# Patient Record
Sex: FEMALE | Race: WHITE | URBAN - METROPOLITAN AREA
[De-identification: names, ages, dates, MRNs, and addresses within clinical notes are randomized per-mention and may not be internally consistent; named-entity substitution may affect disease eponyms.]

---

## 2022-07-06 ENCOUNTER — TELEPHONE (OUTPATIENT)
Dept: PAIN MANAGEMENT | Age: 32
End: 2022-07-06

## 2022-07-12 NOTE — TELEPHONE ENCOUNTER
Mailbox was full and could not accept calls at this time.  Was trying to call pt to let her know that we have appts avail this Thursday 7/14 and Friday 7/15

## 2022-07-15 ENCOUNTER — OFFICE VISIT (OUTPATIENT)
Dept: PAIN MANAGEMENT | Age: 32
End: 2022-07-15
Payer: COMMERCIAL

## 2022-07-15 VITALS
HEART RATE: 68 BPM | OXYGEN SATURATION: 99 % | HEIGHT: 67 IN | BODY MASS INDEX: 25.58 KG/M2 | DIASTOLIC BLOOD PRESSURE: 86 MMHG | TEMPERATURE: 97 F | WEIGHT: 163 LBS | SYSTOLIC BLOOD PRESSURE: 132 MMHG

## 2022-07-15 DIAGNOSIS — F11.20 UNCOMPLICATED OPIOID DEPENDENCE (HCC): Primary | ICD-10-CM

## 2022-07-15 PROCEDURE — 99213 OFFICE O/P EST LOW 20 MIN: CPT | Performed by: PAIN MEDICINE

## 2022-07-15 PROCEDURE — 80305 DRUG TEST PRSMV DIR OPT OBS: CPT | Performed by: PAIN MEDICINE

## 2022-07-15 RX ORDER — BUPRENORPHINE HYDROCHLORIDE, NALOXONE HYDROCHLORIDE 2; .5 MG/1; MG/1
1 FILM, SOLUBLE BUCCAL; SUBLINGUAL DAILY
Qty: 28 FILM | Refills: 0 | Status: SHIPPED | OUTPATIENT
Start: 2022-07-15 | End: 2022-08-15 | Stop reason: SDUPTHER

## 2022-07-15 RX ORDER — BUPRENORPHINE HYDROCHLORIDE, NALOXONE HYDROCHLORIDE 2; .5 MG/1; MG/1
FILM, SOLUBLE BUCCAL; SUBLINGUAL
COMMUNITY
Start: 2022-06-21 | End: 2022-07-15 | Stop reason: SDUPTHER

## 2022-07-15 ASSESSMENT — ENCOUNTER SYMPTOMS
GASTROINTESTINAL NEGATIVE: 1
RESPIRATORY NEGATIVE: 1
ALLERGIC/IMMUNOLOGIC NEGATIVE: 1
EYES NEGATIVE: 1

## 2022-07-15 NOTE — PROGRESS NOTES
Georgia Ramos (:  1990) is a [address] y.o. female,Established patient, here for evaluation of the following chief complaint(s):  No chief complaint on file. ASSESSMENT/PLAN:  1. Uncomplicated opioid dependence (HCC)  -     SUBOXONE 2-0.5 MG FILM SL film; Place 1 Film under the tongue in the morning for 28 days. , Disp-28 Film, R-0, DAWNormal    Pt is here today for follow up in our addiction clinic. She is due refill of Suboxone 2 mg-0.5 mg sublingual film place 1.5 films every day by sublingual route as needed for 28 days. No cravings and no withdrawals. She brought in meeting sheet with 11 signatures on it. No allergic reactions, no reactions and able to ADLs. Westville Rasp is reviewed and consistent. UA is consistent. Will continue current meds. Subjective   SUBJECTIVE/OBJECTIVE:  History of Present Illness    Patient Identification  Georgia Ramos is a [address] y.o. female. Patient information was obtained from patient. History/Exam limitations: none. Patient presented to the Emergency Department ambulatory. Chief Complaint   No chief complaint on file. Pt is here today for follow up in our addiction clinic. She is due refill of Suboxone 2 mg-0.5 mg sublingual film place 1.5 films every day by sublingual route as needed for 28 days. No cravings and no withdrawals. She brought in meeting sheet with 11 signatures on it. No allergic reactions, no reactions and able to ADLs. Westville Rasp is reviewed and consistent. UA is consistent. Past Medical History:  No date: Hepatitis C  History reviewed. No pertinent family history. Current Outpatient Medications:  SUBOXONE 2-0.5 MG FILM SL film, Place 1 Film under the tongue in the morning for 28 days. , Disp: 28 Film, Rfl: 0  gabapentin (NEURONTIN) 300 MG capsule, Take 300 mg by mouth 3 times daily (Patient not taking: Reported on 7/15/2022), Disp: , Rfl:   citalopram (CELEXA) 40 MG tablet, Take 40 mg by mouth daily (Patient not taking: Reported on 7/15/2022), Disp: , Rfl:     No current facility-administered medications for this visit. No Known Allergies  Social History    Socioeconomic History      Marital status: Single      Spouse name: Not on file      Number of children: Not on file      Years of education: Not on file      Highest education level: Not on file    Occupational History      Not on file    Tobacco Use      Smoking status: Every Day        Packs/day: 1.00        Years: 16.00        Pack years: 16        Types: Cigarettes      Smokeless tobacco: Never    Substance and Sexual Activity      Alcohol use: Never      Drug use: on Rx. Sexual activity: Not on file  /86   Pulse 68   Temp 97 °F (36.1 °C)   Ht 5' 7\" (1.702 m)   Wt 163 lb (73.9 kg)   SpO2 99%   BMI 25.53 kg/m²       Review of Systems   Constitutional: Negative. HENT: Negative. Eyes: Negative. Respiratory: Negative. Cardiovascular: Negative. Gastrointestinal: Negative. Endocrine: Negative. Genitourinary: Negative. Musculoskeletal: Negative. Skin: Negative. Allergic/Immunologic: Negative. Neurological: Negative. Hematological: Negative. Psychiatric/Behavioral: Negative. All other systems reviewed and are negative. Objective   Physical Exam  Vitals and nursing note reviewed. Constitutional:       Appearance: Normal appearance. She is normal weight. HENT:      Head: Normocephalic and atraumatic. Nose: Nose normal.      Mouth/Throat:      Mouth: Mucous membranes are moist.   Eyes:      Extraocular Movements: Extraocular movements intact. Conjunctiva/sclera: Conjunctivae normal.      Pupils: Pupils are equal, round, and reactive to light. Cardiovascular:      Rate and Rhythm: Normal rate and regular rhythm. Pulses: Normal pulses. Heart sounds: Normal heart sounds. Pulmonary:      Effort: Pulmonary effort is normal.      Breath sounds: Normal breath sounds.    Abdominal:      General: Abdomen is

## 2022-08-15 ENCOUNTER — OFFICE VISIT (OUTPATIENT)
Dept: PAIN MANAGEMENT | Age: 32
End: 2022-08-15
Payer: COMMERCIAL

## 2022-08-15 VITALS
HEART RATE: 76 BPM | TEMPERATURE: 97.6 F | SYSTOLIC BLOOD PRESSURE: 136 MMHG | OXYGEN SATURATION: 99 % | HEIGHT: 67 IN | WEIGHT: 163 LBS | BODY MASS INDEX: 25.58 KG/M2 | DIASTOLIC BLOOD PRESSURE: 82 MMHG

## 2022-08-15 DIAGNOSIS — F11.20 UNCOMPLICATED OPIOID DEPENDENCE (HCC): Primary | ICD-10-CM

## 2022-08-15 PROCEDURE — 80305 DRUG TEST PRSMV DIR OPT OBS: CPT | Performed by: PAIN MEDICINE

## 2022-08-15 PROCEDURE — 99213 OFFICE O/P EST LOW 20 MIN: CPT | Performed by: PAIN MEDICINE

## 2022-08-15 RX ORDER — BUPRENORPHINE HYDROCHLORIDE, NALOXONE HYDROCHLORIDE 2; .5 MG/1; MG/1
1 FILM, SOLUBLE BUCCAL; SUBLINGUAL DAILY
Qty: 28 FILM | Refills: 0 | Status: SHIPPED | OUTPATIENT
Start: 2022-08-15 | End: 2022-09-12 | Stop reason: SDUPTHER

## 2022-08-15 NOTE — PROGRESS NOTES
Jaye Garcia (:  1990) is a 27 y.o. female,Established patient, here for evaluation of the following chief complaint(s):  No chief complaint on file. ASSESSMENT/PLAN:  1. Uncomplicated opioid dependence (HCC)  -     SUBOXONE 2-0.5 MG FILM SL film; Place 1 Film under the tongue in the morning for 28 days. , Disp-28 Film, R-0, DAWNormal    Pt is here today for follow up in our addiction clinic. She is due refill of Suboxone 2 mg-0.5 mg sublingual film place 1.5 films every day by sublingual route as needed for 28 days. No cravings and no withdrawals. She brought in meeting sheet with 11 signatures on it. No allergic reactions, no reactions and able to ADLs. Bev Paganini is reviewed and consistent. UA is consistent. Will continue current meds. Subjective   SUBJECTIVE/OBJECTIVE:  History of Present Illness    Patient Identification  Jaye Garcia is a 27 y.o. female. Patient information was obtained from patient. History/Exam limitations: none. Patient presented to the Emergency Department ambulatory. Chief Complaint   No chief complaint on file. Pt is here today for follow up in our addiction clinic. She is due refill of Suboxone 2 mg-0.5 mg sublingual film place 1.5 films every day by sublingual route as needed for 28 days. No cravings and no withdrawals. She brought in meeting sheet with 11 signatures on it. No allergic reactions, no reactions and able to ADLs. Bev Paganini is reviewed and consistent. UA in house  is consistent. Past Medical History:  No date: Hepatitis C  History reviewed. No pertinent family history. Current Outpatient Medications:  SUBOXONE 2-0.5 MG FILM SL film, Place 1 Film under the tongue in the morning for 28 days. , Disp: 28 Film, Rfl: 0  gabapentin (NEURONTIN) 300 MG capsule, Take 300 mg by mouth 3 times daily (Patient not taking: Reported on 7/15/2022), Disp: , Rfl:   citalopram (CELEXA) 40 MG tablet, Take 40 mg by mouth daily (Patient not taking: Reported on 7/15/2022), Disp: , Rfl:     No current facility-administered medications for this visit. No Known Allergies  Social History    Socioeconomic History      Marital status: Single      Spouse name: Not on file      Number of children: Not on file      Years of education: Not on file      Highest education level: Not on file    Occupational History      Not on file    Tobacco Use      Smoking status: Every Day        Packs/day: 1.00        Years: 16.00        Pack years: 16        Types: Cigarettes      Smokeless tobacco: Never    Substance and Sexual Activity      Alcohol use: Never      Drug use: on Rx. Sexual activity: Not on file  /86   Pulse 68   Temp 97 °F (36.1 °C)   Ht 5' 7\" (1.702 m)   Wt 163 lb (73.9 kg)   SpO2 99%   BMI 25.53 kg/m²       Review of Systems   Constitutional: Negative. HENT: Negative. Eyes: Negative. Respiratory: Negative. Cardiovascular: Negative. Gastrointestinal: Negative. Endocrine: Negative. Genitourinary: Negative. Musculoskeletal: Negative. Skin: Negative. Allergic/Immunologic: Negative. Neurological: Negative. Hematological: Negative. Psychiatric/Behavioral: Negative. All other systems reviewed and are negative. Objective   Physical Exam  Vitals and nursing note reviewed. Constitutional:       Appearance: Normal appearance. She is normal weight. HENT:      Head: Normocephalic and atraumatic. Nose: Nose normal.      Mouth/Throat:      Mouth: Mucous membranes are moist.   Eyes:      Extraocular Movements: Extraocular movements intact. Conjunctiva/sclera: Conjunctivae normal.      Pupils: Pupils are equal, round, and reactive to light. Cardiovascular:      Rate and Rhythm: Normal rate and regular rhythm. Pulses: Normal pulses. Heart sounds: Normal heart sounds. Pulmonary:      Effort: Pulmonary effort is normal.      Breath sounds: Normal breath sounds.    Abdominal:      General: Abdomen

## 2022-09-12 ENCOUNTER — OFFICE VISIT (OUTPATIENT)
Dept: PAIN MANAGEMENT | Age: 32
End: 2022-09-12
Payer: COMMERCIAL

## 2022-09-12 VITALS
WEIGHT: 160 LBS | TEMPERATURE: 97.1 F | DIASTOLIC BLOOD PRESSURE: 80 MMHG | SYSTOLIC BLOOD PRESSURE: 118 MMHG | HEIGHT: 67 IN | BODY MASS INDEX: 25.11 KG/M2

## 2022-09-12 DIAGNOSIS — F11.20 UNCOMPLICATED OPIOID DEPENDENCE (HCC): Primary | ICD-10-CM

## 2022-09-12 PROCEDURE — 99213 OFFICE O/P EST LOW 20 MIN: CPT | Performed by: PAIN MEDICINE

## 2022-09-12 PROCEDURE — 80305 DRUG TEST PRSMV DIR OPT OBS: CPT | Performed by: PAIN MEDICINE

## 2022-09-12 RX ORDER — BUPRENORPHINE HYDROCHLORIDE, NALOXONE HYDROCHLORIDE 2; .5 MG/1; MG/1
1 FILM, SOLUBLE BUCCAL; SUBLINGUAL DAILY
Qty: 28 FILM | Refills: 0 | Status: SHIPPED | OUTPATIENT
Start: 2022-09-12 | End: 2022-10-10 | Stop reason: SDUPTHER

## 2022-09-12 NOTE — PROGRESS NOTES
Kaylyn Ramirez (:  1990) is a [address] y.o. female,Established patient, here for evaluation of the following chief complaint(s):  No chief complaint on file. ASSESSMENT/PLAN:  1. Uncomplicated opioid dependence (HCC)  -     SUBOXONE 2-0.5 MG FILM SL film; Place 1 Film under the tongue in the morning for 28 days. , Disp-28 Film, R-0, DAWNormal    Pt is here today for follow up in our addiction clinic. She is due refill of Suboxone 2 mg-0.5 mg sublingual film place 1.0  films every day by sublingual route as needed for 28 days. No cravings and no withdrawals. She brought in meeting sheet with 11 signatures on it. No allergic reactions, no reactions and able to ADLs. Angie Jump is reviewed and consistent. UA is consistent. Will continue current meds. Subjective   SUBJECTIVE/OBJECTIVE:  History of Present Illness    Patient Identification  Kaylyn Ramirez is a [address] y.o. female. Patient information was obtained from patient. History/Exam limitations: none. Patient presented to the Emergency Department ambulatory. Chief Complaint   No chief complaint on file. Pt is here today for follow up in our addiction clinic. She is due refill of Suboxone 2 mg-0.5 mg sublingual film place 1.5 films every day by sublingual route as needed for 28 days. No cravings and no withdrawals. She brought in meeting sheet with 11 signatures on it. No allergic reactions, no reactions and able to ADLs. Angie Jump is reviewed and consistent. UA in house  is consistent. Past Medical History:  No date: Hepatitis C  History reviewed. No pertinent family history. Current Outpatient Medications:  SUBOXONE 2-0.5 MG FILM SL film, Place 1 Film under the tongue in the morning for 28 days. , Disp: 28 Film, Rfl: 0  gabapentin (NEURONTIN) 300 MG capsule, Take 300 mg by mouth 3 times daily (Patient not taking: Reported on 7/15/2022), Disp: , Rfl:   citalopram (CELEXA) 40 MG tablet, Take 40 mg by mouth daily (Patient not taking: Reported on 7/15/2022), Disp: , Rfl:     No current facility-administered medications for this visit. No Known Allergies  Social History    Socioeconomic History      Marital status: Single      Spouse name: Not on file      Number of children: Not on file      Years of education: Not on file      Highest education level: Not on file    Occupational History      Not on file    Tobacco Use      Smoking status: Every Day        Packs/day: 1.00        Years: 16.00        Pack years: 16        Types: Cigarettes      Smokeless tobacco: Never    Substance and Sexual Activity      Alcohol use: Never      Drug use: on Rx. Sexual activity: Not on file  /86   Pulse 68   Temp 97 °F (36.1 °C)   Ht 5' 7\" (1.702 m)   Wt 163 lb (73.9 kg)   SpO2 99%   BMI 25.53 kg/m²       Review of Systems   Constitutional: Negative. HENT: Negative. Eyes: Negative. Respiratory: Negative. Cardiovascular: Negative. Gastrointestinal: Negative. Endocrine: Negative. Genitourinary: Negative. Musculoskeletal: Negative. Skin: Negative. Allergic/Immunologic: Negative. Neurological: Negative. Hematological: Negative. Psychiatric/Behavioral: Negative. All other systems reviewed and are negative. Objective   Physical Exam  Vitals and nursing note reviewed. Constitutional:       Appearance: Normal appearance. She is normal weight. HENT:      Head: Normocephalic and atraumatic. Nose: Nose normal.      Mouth/Throat:      Mouth: Mucous membranes are moist.   Eyes:      Extraocular Movements: Extraocular movements intact. Conjunctiva/sclera: Conjunctivae normal.      Pupils: Pupils are equal, round, and reactive to light. Cardiovascular:      Rate and Rhythm: Normal rate and regular rhythm. Pulses: Normal pulses. Heart sounds: Normal heart sounds. Pulmonary:      Effort: Pulmonary effort is normal.      Breath sounds: Normal breath sounds.    Abdominal:      General: Abdomen is flat. Bowel sounds are normal.      Palpations: Abdomen is soft. Musculoskeletal:         General: Normal range of motion. Cervical back: Normal range of motion and neck supple. Skin:     General: Skin is warm. Neurological:      General: No focal deficit present. Mental Status: She is alert and oriented to person, place, and time. Mental status is at baseline. Psychiatric:         Mood and Affect: Mood normal.         Behavior: Behavior normal.         Thought Content: Thought content normal.         Judgment: Judgment normal.              An electronic signature was used to authenticate this note.     --Sydnee Burgos MD

## 2022-10-10 ENCOUNTER — OFFICE VISIT (OUTPATIENT)
Dept: PAIN MANAGEMENT | Age: 32
End: 2022-10-10
Payer: COMMERCIAL

## 2022-10-10 VITALS
DIASTOLIC BLOOD PRESSURE: 78 MMHG | SYSTOLIC BLOOD PRESSURE: 126 MMHG | TEMPERATURE: 97.6 F | HEIGHT: 67 IN | WEIGHT: 160 LBS | BODY MASS INDEX: 25.11 KG/M2 | OXYGEN SATURATION: 97 %

## 2022-10-10 DIAGNOSIS — F11.20 UNCOMPLICATED OPIOID DEPENDENCE (HCC): Primary | ICD-10-CM

## 2022-10-10 LAB
ALCOHOL URINE: POSITIVE
AMPHETAMINE SCREEN, URINE: NORMAL
BARBITURATE SCREEN, URINE: NORMAL
BENZODIAZEPINE SCREEN, URINE: NORMAL
BUPRENORPHINE URINE: POSITIVE
COCAINE METABOLITE SCREEN URINE: NORMAL
FENTANYL SCREEN, URINE: NORMAL
GABAPENTIN SCREEN, URINE: NORMAL
MDMA URINE: NORMAL
METHADONE SCREEN, URINE: NORMAL
METHAMPHETAMINE, URINE: NORMAL
OPIATE SCREEN URINE: NORMAL
OXYCODONE SCREEN URINE: NORMAL
PHENCYCLIDINE SCREEN URINE: NORMAL
PROPOXYPHENE SCREEN, URINE: NORMAL
SYNTHETIC CANNABINOIDS(K2) SCREEN, URINE: NORMAL
THC SCREEN, URINE: NORMAL
TRAMADOL SCREEN URINE: NORMAL
TRICYCLIC ANTIDEPRESSANTS, UR: NORMAL

## 2022-10-10 PROCEDURE — 80305 DRUG TEST PRSMV DIR OPT OBS: CPT | Performed by: PAIN MEDICINE

## 2022-10-10 PROCEDURE — 99213 OFFICE O/P EST LOW 20 MIN: CPT | Performed by: PAIN MEDICINE

## 2022-10-10 RX ORDER — BUPRENORPHINE HYDROCHLORIDE, NALOXONE HYDROCHLORIDE 2; .5 MG/1; MG/1
1 FILM, SOLUBLE BUCCAL; SUBLINGUAL DAILY
Qty: 28 FILM | Refills: 0 | Status: SHIPPED | OUTPATIENT
Start: 2022-10-10 | End: 2022-11-07

## 2022-10-10 NOTE — PROGRESS NOTES
Krystal Morales (:  1990) is a [address] y.o. female,Established patient, here for evaluation of the following chief complaint(s):  Opioid dependence         ASSESSMENT/PLAN:  1. Uncomplicated opioid dependence (HCC)  -     SUBOXONE 2-0.5 MG FILM SL film; Place 1 Film under the tongue in the morning for 28 days. , Disp-28 Film, R-0, DAWNormal    Pt is here today for follow up in our addiction clinic. She is due refill of Suboxone 2 mg-0.5 mg sublingual film place 1.0  films every day by sublingual route as needed for 28 days. No cravings and no withdrawals. She brought in meeting sheet with 11 signatures on it. No allergic reactions, no reactions and able to ADLs. Lindwood Mari is reviewed and consistent. UA is consistent but has alcohol advised to stay away. .  Will continue current meds. Subjective   SUBJECTIVE/OBJECTIVE:  History of Present Illness    Patient Identification  Krystal Morales is a [address] y.o. female. Patient information was obtained from patient. History/Exam limitations: none. Patient presented to the Emergency Department ambulatory. Chief Complaint   No chief complaint on file. Pt is here today for follow up in our addiction clinic. She is due refill of Suboxone 2 mg-0.5 mg sublingual film place 1.5 films every day by sublingual route as needed for 28 days. No cravings and no withdrawals. She brought in meeting sheet with 11 signatures on it. No allergic reactions, no reactions and able to ADLs. Lindwood Mari is reviewed and consistent. UA in house  is consistent. Past Medical History:  No date: Hepatitis C  History reviewed. No pertinent family history. Current Outpatient Medications:  SUBOXONE 2-0.5 MG FILM SL film, Place 1 Film under the tongue in the morning for 28 days. , Disp: 28 Film, Rfl: 0  gabapentin (NEURONTIN) 300 MG capsule, Take 300 mg by mouth 3 times daily (Patient not taking: Reported on 7/15/2022), Disp: , Rfl:   citalopram (CELEXA) 40 MG tablet, Take 40 mg by mouth daily (Patient not taking: Reported on 7/15/2022), Disp: , Rfl:     No current facility-administered medications for this visit. No Known Allergies  Social History    Socioeconomic History      Marital status: Single      Spouse name: Not on file      Number of children: Not on file      Years of education: Not on file      Highest education level: Not on file    Occupational History      Not on file    Tobacco Use      Smoking status: Every Day        Packs/day: 1.00        Years: 16.00        Pack years: 16        Types: Cigarettes      Smokeless tobacco: Never    Substance and Sexual Activity      Alcohol use: Never      Drug use: on Rx. Sexual activity: Not on file  /86   Pulse 68   Temp 97 °F (36.1 °C)   Ht 5' 7\" (1.702 m)   Wt 163 lb (73.9 kg)   SpO2 99%   BMI 25.53 kg/m²       Review of Systems   Constitutional: Negative. HENT: Negative. Eyes: Negative. Respiratory: Negative. Cardiovascular: Negative. Gastrointestinal: Negative. Endocrine: Negative. Genitourinary: Negative. Musculoskeletal: Negative. Skin: Negative. Allergic/Immunologic: Negative. Neurological: Negative. Hematological: Negative. Psychiatric/Behavioral: Negative. All other systems reviewed and are negative. Objective   Physical Exam  Vitals and nursing note reviewed. Constitutional:       Appearance: Normal appearance. She is normal weight. HENT:      Head: Normocephalic and atraumatic. Nose: Nose normal.      Mouth/Throat:      Mouth: Mucous membranes are moist.   Eyes:      Extraocular Movements: Extraocular movements intact. Conjunctiva/sclera: Conjunctivae normal.      Pupils: Pupils are equal, round, and reactive to light. Cardiovascular:      Rate and Rhythm: Normal rate and regular rhythm. Pulses: Normal pulses. Heart sounds: Normal heart sounds. Pulmonary:      Effort: Pulmonary effort is normal.      Breath sounds: Normal breath sounds. Abdominal:      General: Abdomen is flat. Bowel sounds are normal.      Palpations: Abdomen is soft. Musculoskeletal:         General: Normal range of motion. Cervical back: Normal range of motion and neck supple. Skin:     General: Skin is warm. Neurological:      General: No focal deficit present. Mental Status: She is alert and oriented to person, place, and time. Mental status is at baseline. Psychiatric:         Mood and Affect: Mood normal.         Behavior: Behavior normal.         Thought Content: Thought content normal.         Judgment: Judgment normal.              An electronic signature was used to authenticate this note.     --Rima Parnell MD

## 2022-10-26 ENCOUNTER — TELEPHONE (OUTPATIENT)
Dept: PAIN MANAGEMENT | Age: 32
End: 2022-10-26

## 2022-10-26 NOTE — TELEPHONE ENCOUNTER
Tried to leave a message for the patient to please contact the office , Dr Eagle Telles will be out of the office on 11/4 and the appointment had to be cancelled. Message for the patient to please call to get rescheduled.  Ok to double book on 11/2 with a follow up

## 2022-11-07 ENCOUNTER — OFFICE VISIT (OUTPATIENT)
Dept: PAIN MANAGEMENT | Age: 32
End: 2022-11-07

## 2022-11-07 VITALS
TEMPERATURE: 97.4 F | DIASTOLIC BLOOD PRESSURE: 76 MMHG | WEIGHT: 160 LBS | HEIGHT: 67 IN | SYSTOLIC BLOOD PRESSURE: 118 MMHG | BODY MASS INDEX: 25.11 KG/M2

## 2022-11-07 DIAGNOSIS — F11.20 UNCOMPLICATED OPIOID DEPENDENCE (HCC): Primary | ICD-10-CM

## 2022-11-07 RX ORDER — BUPRENORPHINE HYDROCHLORIDE, NALOXONE HYDROCHLORIDE 2; .5 MG/1; MG/1
1 FILM, SOLUBLE BUCCAL; SUBLINGUAL DAILY
Qty: 28 FILM | Refills: 0 | Status: SHIPPED | OUTPATIENT
Start: 2022-11-07 | End: 2022-12-05

## 2022-11-07 NOTE — PROGRESS NOTES
Rodriguez Sidhu (:  1990) is a 27 y.o. female,Established patient, here for evaluation of the following chief complaint(s):  Opioid dependence         ASSESSMENT/PLAN:  1. Uncomplicated opioid dependence (HCC)  -     SUBOXONE 2-0.5 MG FILM SL film; Place 1 Film under the tongue in the morning for 28 days. , Disp-28 Film, R-0, DAWNormal    Pt is here today for follow up in our addiction clinic. She is due refill of Suboxone 2 mg-0.5 mg sublingual film place 1.0  films every day by sublingual route as needed for 28 days. No cravings and no withdrawals. She brought in meeting sheet with 11 signatures on it. No allergic reactions, no reactions and able to ADLs. Reji Brim is reviewed and consistent. UA is consistent . Will continue current meds. Advised to wean off. Subjective   SUBJECTIVE/OBJECTIVE:  History of Present Illness    Patient Identification  Rodriguez Sidhu is a 27 y.o. female. Patient information was obtained from patient. History/Exam limitations: none. Patient presented to the Emergency Department ambulatory. Chief Complaint   No chief complaint on file. Pt is here today for follow up in our addiction clinic. She is due refill of Suboxone 2 mg-0.5 mg sublingual film place 1.5 films every day by sublingual route as needed for 28 days. No cravings and no withdrawals. She brought in meeting sheet with 11 signatures on it. No allergic reactions, no reactions and able to ADLs. Reji Brim is reviewed and consistent. UA in house  is consistent. Past Medical History:  No date: Hepatitis C  History reviewed. No pertinent family history. Current Outpatient Medications:  SUBOXONE 2-0.5 MG FILM SL film, Place 1 Film under the tongue in the morning for 28 days. , Disp: 28 Film, Rfl: 0  gabapentin (NEURONTIN) 300 MG capsule, Take 300 mg by mouth 3 times daily (Patient not taking: Reported on 7/15/2022), Disp: , Rfl:   citalopram (CELEXA) 40 MG tablet, Take 40 mg by mouth daily (Patient not taking: Reported on 7/15/2022), Disp: , Rfl:     No current facility-administered medications for this visit. No Known Allergies  Social History    Socioeconomic History      Marital status: Single      Spouse name: Not on file      Number of children: Not on file      Years of education: Not on file      Highest education level: Not on file    Occupational History      Not on file    Tobacco Use      Smoking status: Every Day        Packs/day: 1.00        Years: 16.00        Pack years: 16        Types: Cigarettes      Smokeless tobacco: Never    Substance and Sexual Activity      Alcohol use: Never      Drug use: on Rx. Sexual activity: Not on file  /86   Pulse 68   Temp 97 °F (36.1 °C)   Ht 5' 7\" (1.702 m)   Wt 163 lb (73.9 kg)   SpO2 99%   BMI 25.53 kg/m²       Review of Systems   Constitutional: Negative. HENT: Negative. Eyes: Negative. Respiratory: Negative. Cardiovascular: Negative. Gastrointestinal: Negative. Endocrine: Negative. Genitourinary: Negative. Musculoskeletal: Negative. Skin: Negative. Allergic/Immunologic: Negative. Neurological: Negative. Hematological: Negative. Psychiatric/Behavioral: Negative. All other systems reviewed and are negative. Objective   Physical Exam  Vitals and nursing note reviewed. Constitutional:       Appearance: Normal appearance. She is normal weight. HENT:      Head: Normocephalic and atraumatic. Nose: Nose normal.      Mouth/Throat:      Mouth: Mucous membranes are moist.   Eyes:      Extraocular Movements: Extraocular movements intact. Conjunctiva/sclera: Conjunctivae normal.      Pupils: Pupils are equal, round, and reactive to light. Cardiovascular:      Rate and Rhythm: Normal rate and regular rhythm. Pulses: Normal pulses. Heart sounds: Normal heart sounds. Pulmonary:      Effort: Pulmonary effort is normal.      Breath sounds: Normal breath sounds.    Abdominal: General: Abdomen is flat. Bowel sounds are normal.      Palpations: Abdomen is soft. Musculoskeletal:         General: Normal range of motion. Cervical back: Normal range of motion and neck supple. Skin:     General: Skin is warm. Neurological:      General: No focal deficit present. Mental Status: She is alert and oriented to person, place, and time. Mental status is at baseline. Psychiatric:         Mood and Affect: Mood normal.         Behavior: Behavior normal.         Thought Content: Thought content normal.         Judgment: Judgment normal.              An electronic signature was used to authenticate this note.     --Lizbeth Sims MD

## 2022-12-07 ENCOUNTER — OFFICE VISIT (OUTPATIENT)
Dept: PAIN MANAGEMENT | Age: 32
End: 2022-12-07

## 2022-12-07 VITALS
HEIGHT: 67 IN | OXYGEN SATURATION: 100 % | DIASTOLIC BLOOD PRESSURE: 78 MMHG | BODY MASS INDEX: 26.68 KG/M2 | SYSTOLIC BLOOD PRESSURE: 132 MMHG | WEIGHT: 170 LBS | HEART RATE: 78 BPM | TEMPERATURE: 97.8 F

## 2022-12-07 DIAGNOSIS — F11.20 UNCOMPLICATED OPIOID DEPENDENCE (HCC): Primary | ICD-10-CM

## 2022-12-07 RX ORDER — BUPRENORPHINE HYDROCHLORIDE, NALOXONE HYDROCHLORIDE 2; .5 MG/1; MG/1
1 FILM, SOLUBLE BUCCAL; SUBLINGUAL DAILY
Qty: 28 FILM | Refills: 0 | Status: SHIPPED | OUTPATIENT
Start: 2022-12-07 | End: 2023-01-04

## 2022-12-07 NOTE — PROGRESS NOTES
West Salazar (:  1990) is a 27 y.o. female,Established patient, here for evaluation of the following chief complaint(s):  Opioid dependence         ASSESSMENT/PLAN:  1. Uncomplicated opioid dependence (HCC)  -     SUBOXONE 2-0.5 MG FILM SL film; Place 1 Film under the tongue in the morning for 28 days. , Disp-28 Film, R-0, DAWNormal    Pt is here today for follow up in our addiction clinic. She is due refill of Suboxone 2 mg-0.5 mg sublingual film place 1.0  films every day by sublingual route as needed for 28 days. No cravings and no withdrawals. She brought in meeting sheet with 11 signatures on it. No allergic reactions, no reactions and able to ADLs. Cher Shuck is reviewed and consistent. UA is consistent . Will continue current meds. Advised to wean off. Subjective   SUBJECTIVE/OBJECTIVE:  History of Present Illness    Patient Identification  West Salazar is a 27 y.o. female. Patient information was obtained from patient. History/Exam limitations: none. Patient presented to the Emergency Department ambulatory. Chief Complaint   No chief complaint on file. Pt is here today for follow up in our addiction clinic. She is due refill of Suboxone 2 mg-0.5 mg sublingual film place 1.5 films every day by sublingual route as needed for 28 days. No cravings and no withdrawals. She brought in meeting sheet with 11 signatures on it. No allergic reactions, no reactions and able to ADLs. Cher Jason is reviewed and consistent. UA in house  is consistent. Past Medical History:  No date: Hepatitis C  History reviewed. No pertinent family history. Current Outpatient Medications:  SUBOXONE 2-0.5 MG FILM SL film, Place 1 Film under the tongue in the morning for 28 days. , Disp: 28 Film, Rfl: 0  gabapentin (NEURONTIN) 300 MG capsule, Take 300 mg by mouth 3 times daily (Patient not taking: Reported on 7/15/2022), Disp: , Rfl:   citalopram (CELEXA) 40 MG tablet, Take 40 mg by mouth daily (Patient not taking: Reported on 7/15/2022), Disp: , Rfl:     No current facility-administered medications for this visit. No Known Allergies  Social History    Socioeconomic History      Marital status: Single      Spouse name: Not on file      Number of children: Not on file      Years of education: Not on file      Highest education level: Not on file    Occupational History      Not on file    Tobacco Use      Smoking status: Every Day        Packs/day: 1.00        Years: 16.00        Pack years: 16        Types: Cigarettes      Smokeless tobacco: Never    Substance and Sexual Activity      Alcohol use: Never      Drug use: on Rx. Sexual activity: Not on file  /86   Pulse 68   Temp 97 °F (36.1 °C)   Ht 5' 7\" (1.702 m)   Wt 163 lb (73.9 kg)   SpO2 99%   BMI 25.53 kg/m²       Review of Systems   Constitutional: Negative. HENT: Negative. Eyes: Negative. Respiratory: Negative. Cardiovascular: Negative. Gastrointestinal: Negative. Endocrine: Negative. Genitourinary: Negative. Musculoskeletal: Negative. Skin: Negative. Allergic/Immunologic: Negative. Neurological: Negative. Hematological: Negative. Psychiatric/Behavioral: Negative. All other systems reviewed and are negative. Objective   Physical Exam  Vitals and nursing note reviewed. Constitutional:       Appearance: Normal appearance. She is normal weight. HENT:      Head: Normocephalic and atraumatic. Nose: Nose normal.      Mouth/Throat:      Mouth: Mucous membranes are moist.   Eyes:      Extraocular Movements: Extraocular movements intact. Conjunctiva/sclera: Conjunctivae normal.      Pupils: Pupils are equal, round, and reactive to light. Cardiovascular:      Rate and Rhythm: Normal rate and regular rhythm. Pulses: Normal pulses. Heart sounds: Normal heart sounds. Pulmonary:      Effort: Pulmonary effort is normal.      Breath sounds: Normal breath sounds.    Abdominal: General: Abdomen is flat. Bowel sounds are normal.      Palpations: Abdomen is soft. Musculoskeletal:         General: Normal range of motion. Cervical back: Normal range of motion and neck supple. Skin:     General: Skin is warm. Neurological:      General: No focal deficit present. Mental Status: She is alert and oriented to person, place, and time. Mental status is at baseline. Psychiatric:         Mood and Affect: Mood normal.         Behavior: Behavior normal.         Thought Content: Thought content normal.         Judgment: Judgment normal.              An electronic signature was used to authenticate this note.     --Juliann Whitman MD

## 2023-01-04 ENCOUNTER — OFFICE VISIT (OUTPATIENT)
Dept: PAIN MANAGEMENT | Age: 33
End: 2023-01-04

## 2023-01-04 VITALS — TEMPERATURE: 97 F | WEIGHT: 151 LBS | HEIGHT: 67 IN | BODY MASS INDEX: 23.7 KG/M2

## 2023-01-04 DIAGNOSIS — F11.20 UNCOMPLICATED OPIOID DEPENDENCE (HCC): Primary | ICD-10-CM

## 2023-01-04 RX ORDER — BUPRENORPHINE HYDROCHLORIDE, NALOXONE HYDROCHLORIDE 2; .5 MG/1; MG/1
1 FILM, SOLUBLE BUCCAL; SUBLINGUAL DAILY
Qty: 28 FILM | Refills: 0 | Status: SHIPPED | OUTPATIENT
Start: 2023-01-04 | End: 2023-02-01

## 2023-01-04 NOTE — PROGRESS NOTES
Kaylyn Ramirez (:  1990) is a 27 y.o. female,Established patient, here for evaluation of the following chief complaint(s):  Opioid dependence         ASSESSMENT/PLAN:  1. Uncomplicated opioid dependence (HCC)  -     SUBOXONE 2-0.5 MG FILM SL film; Place 1 Film under the tongue in the morning for 28 days. , Disp-28 Film, R-0, DAWNormal    Pt is here today for follow up in our addiction clinic. She is due refill of Suboxone 2 mg-0.5 mg sublingual film place 1.0  films every day by sublingual route as needed for 28 days. No cravings and no withdrawals. She brought in meeting sheet with 11 signatures on it. No allergic reactions, no reactions and able to ADLs. Angie Jump is reviewed and consistent. UA is consistent . Will continue current meds. Advised to wean off. Subjective   SUBJECTIVE/OBJECTIVE:  History of Present Illness    Patient Identification  Kaylyn Ramirez is a 27 y.o. female. Patient information was obtained from patient. History/Exam limitations: none. Patient presented to the Emergency Department ambulatory. Chief Complaint   No chief complaint on file. Pt is here today for follow up in our addiction clinic. She is due refill of Suboxone 2 mg-0.5 mg sublingual film place 1.5 films every day by sublingual route as needed for 28 days. No cravings and no withdrawals. She brought in meeting sheet with 11 signatures on it. No allergic reactions, no reactions and able to ADLs. Angie Jump is reviewed and consistent. UA in house  is consistent. Past Medical History:  No date: Hepatitis C  History reviewed. No pertinent family history. Current Outpatient Medications:  SUBOXONE 2-0.5 MG FILM SL film, Place 1 Film under the tongue in the morning for 28 days. , Disp: 28 Film, Rfl: 0  gabapentin (NEURONTIN) 300 MG capsule, Take 300 mg by mouth 3 times daily (Patient not taking: Reported on 7/15/2022), Disp: , Rfl:   citalopram (CELEXA) 40 MG tablet, Take 40 mg by mouth daily (Patient not taking: Reported on 7/15/2022), Disp: , Rfl:     No current facility-administered medications for this visit. No Known Allergies  Social History    Socioeconomic History      Marital status: Single      Spouse name: Not on file      Number of children: Not on file      Years of education: Not on file      Highest education level: Not on file    Occupational History      Not on file    Tobacco Use      Smoking status: Every Day        Packs/day: 1.00        Years: 16.00        Pack years: 16        Types: Cigarettes      Smokeless tobacco: Never    Substance and Sexual Activity      Alcohol use: Never      Drug use: on Rx. Sexual activity: Not on file  /86   Pulse 68   Temp 97 °F (36.1 °C)   Ht 5' 7\" (1.702 m)   Wt 163 lb (73.9 kg)   SpO2 99%   BMI 25.53 kg/m²       Review of Systems   Constitutional: Negative. HENT: Negative. Eyes: Negative. Respiratory: Negative. Cardiovascular: Negative. Gastrointestinal: Negative. Endocrine: Negative. Genitourinary: Negative. Musculoskeletal: Negative. Skin: Negative. Allergic/Immunologic: Negative. Neurological: Negative. Hematological: Negative. Psychiatric/Behavioral: Negative. All other systems reviewed and are negative. Objective   Physical Exam  Vitals and nursing note reviewed. Constitutional:       Appearance: Normal appearance. She is normal weight. HENT:      Head: Normocephalic and atraumatic. Nose: Nose normal.      Mouth/Throat:      Mouth: Mucous membranes are moist.   Eyes:      Extraocular Movements: Extraocular movements intact. Conjunctiva/sclera: Conjunctivae normal.      Pupils: Pupils are equal, round, and reactive to light. Cardiovascular:      Rate and Rhythm: Normal rate and regular rhythm. Pulses: Normal pulses. Heart sounds: Normal heart sounds. Pulmonary:      Effort: Pulmonary effort is normal.      Breath sounds: Normal breath sounds.    Abdominal: General: Abdomen is flat. Bowel sounds are normal.      Palpations: Abdomen is soft. Musculoskeletal:         General: Normal range of motion. Cervical back: Normal range of motion and neck supple. Skin:     General: Skin is warm. Neurological:      General: No focal deficit present. Mental Status: She is alert and oriented to person, place, and time. Mental status is at baseline. Psychiatric:         Mood and Affect: Mood normal.         Behavior: Behavior normal.         Thought Content:  Thought content normal.         Judgment: Judgment normal.            --Chencho Love MD

## 2023-01-06 ENCOUNTER — TELEPHONE (OUTPATIENT)
Dept: PAIN MANAGEMENT | Age: 33
End: 2023-01-06

## 2023-01-06 NOTE — TELEPHONE ENCOUNTER
Prior authorization for Suboxone 2.05 mg film submitted to [employer], clinical uploaded, authorization pending Key: [id number] - [place] Case ID: [id number]
(4) rarely moist

## 2023-01-06 NOTE — TELEPHONE ENCOUNTER
Prior authorization for Suboxone 2.05 mg film submitted to [employer], clinical uploaded, authorization pending Key: [id number] - [place] Case ID: [id number]  Approvedon January 8  The request has been approved. The authorization is effective for a maximum of 12 fills from 01/08/2023 to 01/07/2024, as long as the member is enrolled in their current health plan. The request was reviewed and approved by a licensed clinical pharmacist. A written notification letter will follow with additional details.

## 2023-02-01 ENCOUNTER — OFFICE VISIT (OUTPATIENT)
Dept: PAIN MANAGEMENT | Age: 33
End: 2023-02-01

## 2023-02-01 VITALS
SYSTOLIC BLOOD PRESSURE: 136 MMHG | HEART RATE: 81 BPM | BODY MASS INDEX: 24.01 KG/M2 | WEIGHT: 153 LBS | TEMPERATURE: 98.1 F | DIASTOLIC BLOOD PRESSURE: 72 MMHG | HEIGHT: 67 IN | OXYGEN SATURATION: 100 %

## 2023-02-01 DIAGNOSIS — F11.20 UNCOMPLICATED OPIOID DEPENDENCE (HCC): Primary | ICD-10-CM

## 2023-02-01 RX ORDER — BUPRENORPHINE HYDROCHLORIDE, NALOXONE HYDROCHLORIDE 2; .5 MG/1; MG/1
1 FILM, SOLUBLE BUCCAL; SUBLINGUAL DAILY
Qty: 28 FILM | Refills: 0 | Status: SHIPPED | OUTPATIENT
Start: 2023-02-01 | End: 2023-03-01

## 2023-02-01 NOTE — PROGRESS NOTES
Deya Navas (:  1990) is a 27 y.o. female,Established patient, here for evaluation of the following chief complaint(s):  Opioid dependence         ASSESSMENT/PLAN:  1. Uncomplicated opioid dependence (HCC)  -     SUBOXONE 2-0.5 MG FILM SL film; Place 1 Film under the tongue in the morning for 28 days. , Disp-28 Film, R-0, DAWNormal    Pt is here today for follow up in our addiction clinic. She is due refill of Suboxone 2 mg-0.5 mg sublingual film place 1.0  films every day by sublingual route as needed for 28 days. No cravings and no withdrawals. She brought in meeting sheet with 11 signatures on it. No allergic reactions, no reactions and able to ADLs. Pedroza Shirley is reviewed and consistent. UA is consistent . Will continue current meds. Advised to wean off. Subjective   SUBJECTIVE/OBJECTIVE:  History of Present Illness    Patient Identification  Deya Navas is a 27 y.o. female. Patient information was obtained from patient. History/Exam limitations: none. Patient presented to the Emergency Department ambulatory. Chief Complaint   No chief complaint on file. Pt is here today for follow up in our addiction clinic. She is due refill of Suboxone 2 mg-0.5 mg sublingual film place 1.5 films every day by sublingual route as needed for 28 days. No cravings and no withdrawals. She brought in meeting sheet with 11 signatures on it. No allergic reactions, no reactions and able to ADLs. Pedroza Mihir is reviewed and consistent. UA in house  is consistent. Past Medical History:  No date: Hepatitis C  History reviewed. No pertinent family history. Current Outpatient Medications:  SUBOXONE 2-0.5 MG FILM SL film, Place 1 Film under the tongue in the morning for 28 days. , Disp: 28 Film, Rfl: 0  gabapentin (NEURONTIN) 300 MG capsule, Take 300 mg by mouth 3 times daily (Patient not taking: Reported on 7/15/2022), Disp: , Rfl:   citalopram (CELEXA) 40 MG tablet, Take 40 mg by mouth daily (Patient not taking: Reported on 7/15/2022), Disp: , Rfl:     No current facility-administered medications for this visit. No Known Allergies  Social History    Socioeconomic History      Marital status: Single      Spouse name: Not on file      Number of children: Not on file      Years of education: Not on file      Highest education level: Not on file    Occupational History      Not on file    Tobacco Use      Smoking status: Every Day        Packs/day: 1.00        Years: 16.00        Pack years: 16        Types: Cigarettes      Smokeless tobacco: Never    Substance and Sexual Activity      Alcohol use: Never      Drug use: on Rx. Sexual activity: Not on file  /86   Pulse 68   Temp 97 °F (36.1 °C)   Ht 5' 7\" (1.702 m)   Wt 163 lb (73.9 kg)   SpO2 99%   BMI 25.53 kg/m²       Review of Systems   Constitutional: Negative. HENT: Negative. Eyes: Negative. Respiratory: Negative. Cardiovascular: Negative. Gastrointestinal: Negative. Endocrine: Negative. Genitourinary: Negative. Musculoskeletal: Negative. Skin: Negative. Allergic/Immunologic: Negative. Neurological: Negative. Hematological: Negative. Psychiatric/Behavioral: Negative. All other systems reviewed and are negative. Objective   Physical Exam  Vitals and nursing note reviewed. Constitutional:       Appearance: Normal appearance. She is normal weight. HENT:      Head: Normocephalic and atraumatic. Nose: Nose normal.      Mouth/Throat:      Mouth: Mucous membranes are moist.   Eyes:      Extraocular Movements: Extraocular movements intact. Conjunctiva/sclera: Conjunctivae normal.      Pupils: Pupils are equal, round, and reactive to light. Cardiovascular:      Rate and Rhythm: Normal rate and regular rhythm. Pulses: Normal pulses. Heart sounds: Normal heart sounds. Pulmonary:      Effort: Pulmonary effort is normal.      Breath sounds: Normal breath sounds.    Abdominal: General: Abdomen is flat. Bowel sounds are normal.      Palpations: Abdomen is soft. Musculoskeletal:         General: Normal range of motion. Cervical back: Normal range of motion and neck supple. Skin:     General: Skin is warm. Neurological:      General: No focal deficit present. Mental Status: She is alert and oriented to person, place, and time. Mental status is at baseline. Psychiatric:         Mood and Affect: Mood normal.         Behavior: Behavior normal.         Thought Content:  Thought content normal.         Judgment: Judgment normal.      --Kalia Grover MD

## 2023-02-15 ENCOUNTER — TELEPHONE (OUTPATIENT)
Dept: PAIN MANAGEMENT | Age: 33
End: 2023-02-15

## 2023-02-15 NOTE — TELEPHONE ENCOUNTER
Patient is prescribed suboxone by Dr. Dulce Maria Mireles. Pt asks if she is allowed to take benadryl while on that prescription? Pt states she woke up with a swollen lip.

## 2023-03-01 ENCOUNTER — OFFICE VISIT (OUTPATIENT)
Dept: PAIN MANAGEMENT | Age: 33
End: 2023-03-01

## 2023-03-01 VITALS
HEIGHT: 67 IN | SYSTOLIC BLOOD PRESSURE: 124 MMHG | OXYGEN SATURATION: 99 % | TEMPERATURE: 98.1 F | DIASTOLIC BLOOD PRESSURE: 78 MMHG | BODY MASS INDEX: 23.29 KG/M2 | WEIGHT: 148.4 LBS | HEART RATE: 81 BPM

## 2023-03-01 DIAGNOSIS — F11.20 UNCOMPLICATED OPIOID DEPENDENCE (HCC): Primary | ICD-10-CM

## 2023-03-01 RX ORDER — BUPRENORPHINE HYDROCHLORIDE, NALOXONE HYDROCHLORIDE 2; .5 MG/1; MG/1
1 FILM, SOLUBLE BUCCAL; SUBLINGUAL DAILY
Qty: 28 FILM | Refills: 0 | Status: SHIPPED | OUTPATIENT
Start: 2023-03-01 | End: 2023-03-29

## 2023-03-01 NOTE — PROGRESS NOTES
Suzanna Maria (:  1990) is a 32 y.o. female,Established patient, here for evaluation of the following chief complaint(s):  Opioid dependence         ASSESSMENT/PLAN:  1. Uncomplicated opioid dependence (HCC)  -     SUBOXONE 2-0.5 MG FILM SL film; Place 1 Film under the tongue in the morning for 28 days., Disp-28 Film, R-0, HAY Normal   Pt is here today for follow up in our addiction clinic. She is due refill of Suboxone 2 mg-0.5 mg sublingual film place 1.0  films every day by sublingual route as needed for 28 days. No cravings and no withdrawals. She brought in meeting sheet with 11 signatures on it. No allergic reactions, no reactions and able to ADLs. OARRs is reviewed and consistent. UA is consistent .  Will continue current meds. Advised to wean off.         Subjective   SUBJECTIVE/OBJECTIVE:  History of Present Illness    Patient Identification  Suzanna Maria is a 32 y.o. female.    Patient information was obtained from patient.  History/Exam limitations: none.  Patient presented to the Emergency Department ambulatory.    Chief Complaint   No chief complaint on file.    Pt is here today for follow up in our addiction clinic. She is due refill of Suboxone 2 mg-0.5 mg sublingual film place 1.5 films every day by sublingual route as needed for 28 days. No cravings and no withdrawals. She brought in meeting sheet with 11 signatures on it. No allergic reactions, no reactions and able to ADLs.  OARRs is reviewed and consistent. UA in house  is consistent.    Past Medical History:  No date: Hepatitis C  History reviewed.  No pertinent family history.    Current Outpatient Medications:  SUBOXONE 2-0.5 MG FILM SL film, Place 1 Film under the tongue in the morning for 28 days., Disp: 28 Film, Rfl: 0  gabapentin (NEURONTIN) 300 MG capsule, Take 300 mg by mouth 3 times daily (Patient not taking: Reported on 7/15/2022), Disp: , Rfl:   citalopram (CELEXA) 40 MG tablet, Take 40 mg by mouth daily (Patient not  taking: Reported on 7/15/2022), Disp: , Rfl:     No current facility-administered medications for this visit. No Known Allergies  Social History    Socioeconomic History      Marital status: Single      Spouse name: Not on file      Number of children: Not on file      Years of education: Not on file      Highest education level: Not on file    Occupational History      Not on file    Tobacco Use      Smoking status: Every Day        Packs/day: 1.00        Years: 16.00        Pack years: 16        Types: Cigarettes      Smokeless tobacco: Never    Substance and Sexual Activity      Alcohol use: Never      Drug use: on Rx. Sexual activity: Not on file  /86   Pulse 68   Temp 97 °F (36.1 °C)   Ht 5' 7\" (1.702 m)   Wt 163 lb (73.9 kg)   SpO2 99%   BMI 25.53 kg/m²       Review of Systems   Constitutional: Negative. HENT: Negative. Eyes: Negative. Respiratory: Negative. Cardiovascular: Negative. Gastrointestinal: Negative. Endocrine: Negative. Genitourinary: Negative. Musculoskeletal: Negative. Skin: Negative. Allergic/Immunologic: Negative. Neurological: Negative. Hematological: Negative. Psychiatric/Behavioral: Negative. All other systems reviewed and are negative. Objective   Physical Exam  Vitals and nursing note reviewed. Constitutional:       Appearance: Normal appearance. She is normal weight. HENT:      Head: Normocephalic and atraumatic. Nose: Nose normal.      Mouth/Throat:      Mouth: Mucous membranes are moist.   Eyes:      Extraocular Movements: Extraocular movements intact. Conjunctiva/sclera: Conjunctivae normal.      Pupils: Pupils are equal, round, and reactive to light. Cardiovascular:      Rate and Rhythm: Normal rate and regular rhythm. Pulses: Normal pulses. Heart sounds: Normal heart sounds. Pulmonary:      Effort: Pulmonary effort is normal.      Breath sounds: Normal breath sounds.    Abdominal: General: Abdomen is flat. Bowel sounds are normal.      Palpations: Abdomen is soft. Musculoskeletal:         General: Normal range of motion. Cervical back: Normal range of motion and neck supple. Skin:     General: Skin is warm. Neurological:      General: No focal deficit present. Mental Status: She is alert and oriented to person, place, and time. Mental status is at baseline. Psychiatric:         Mood and Affect: Mood normal.         Behavior: Behavior normal.         Thought Content:  Thought content normal.         Judgment: Judgment normal.      --Fiona Hernandez MD

## 2023-03-29 ENCOUNTER — OFFICE VISIT (OUTPATIENT)
Dept: PAIN MANAGEMENT | Age: 33
End: 2023-03-29
Payer: COMMERCIAL

## 2023-03-29 VITALS
HEART RATE: 85 BPM | TEMPERATURE: 97.2 F | DIASTOLIC BLOOD PRESSURE: 80 MMHG | WEIGHT: 148 LBS | OXYGEN SATURATION: 99 % | SYSTOLIC BLOOD PRESSURE: 132 MMHG | BODY MASS INDEX: 23.23 KG/M2 | HEIGHT: 67 IN

## 2023-03-29 DIAGNOSIS — F11.20 UNCOMPLICATED OPIOID DEPENDENCE (HCC): Primary | ICD-10-CM

## 2023-03-29 PROCEDURE — 80305 DRUG TEST PRSMV DIR OPT OBS: CPT | Performed by: PAIN MEDICINE

## 2023-03-29 PROCEDURE — 99213 OFFICE O/P EST LOW 20 MIN: CPT | Performed by: PAIN MEDICINE

## 2023-03-29 RX ORDER — BUPRENORPHINE HYDROCHLORIDE, NALOXONE HYDROCHLORIDE 2; .5 MG/1; MG/1
1 FILM, SOLUBLE BUCCAL; SUBLINGUAL DAILY
Qty: 28 FILM | Refills: 0 | Status: SHIPPED | OUTPATIENT
Start: 2023-03-29 | End: 2023-04-26

## 2023-03-29 NOTE — PROGRESS NOTES
Abdomen is flat. Bowel sounds are normal.      Palpations: Abdomen is soft. Musculoskeletal:         General: Normal range of motion. Cervical back: Normal range of motion and neck supple. Skin:     General: Skin is warm. Neurological:      General: No focal deficit present. Mental Status: She is alert and oriented to person, place, and time. Mental status is at baseline. Psychiatric:         Mood and Affect: Mood normal.         Behavior: Behavior normal.         Thought Content:  Thought content normal.         Judgment: Judgment normal.      --Erik Ya MD

## 2023-04-26 ENCOUNTER — OFFICE VISIT (OUTPATIENT)
Dept: PAIN MANAGEMENT | Age: 33
End: 2023-04-26
Payer: COMMERCIAL

## 2023-04-26 VITALS
TEMPERATURE: 97.5 F | SYSTOLIC BLOOD PRESSURE: 120 MMHG | DIASTOLIC BLOOD PRESSURE: 76 MMHG | HEIGHT: 67 IN | BODY MASS INDEX: 23.07 KG/M2 | WEIGHT: 147 LBS

## 2023-04-26 DIAGNOSIS — F11.20 UNCOMPLICATED OPIOID DEPENDENCE (HCC): ICD-10-CM

## 2023-04-26 PROCEDURE — 99213 OFFICE O/P EST LOW 20 MIN: CPT | Performed by: PAIN MEDICINE

## 2023-04-26 RX ORDER — BUPRENORPHINE HYDROCHLORIDE, NALOXONE HYDROCHLORIDE 2; .5 MG/1; MG/1
1 FILM, SOLUBLE BUCCAL; SUBLINGUAL DAILY
Qty: 28 FILM | Refills: 0 | Status: SHIPPED | OUTPATIENT
Start: 2023-04-26 | End: 2023-05-24

## 2023-04-26 NOTE — PROGRESS NOTES
Martha Beyer (:  1990) is a 27 y.o. female,Established patient, here for evaluation of the following chief complaint(s):  Opioid dependence         ASSESSMENT/PLAN:  1. Uncomplicated opioid dependence (HCC)  SUBOXONE 2-0.5 MG FILM SL film; Place 1 Film under the tongue in the morning for 28 days. , Disp-28 Film, R-0, HAY Normal   Pt is here today for follow up in our addiction clinic. She is due refill of Suboxone 2 mg-0.5 mg sublingual film place 1.0  films every day by sublingual route as needed for 28 days. No cravings and no withdrawals. She brought in meeting sheet with 12 signatures on it. No allergic reactions, no reactions and able to ADLs. Jackalyn Kiester is reviewed and consistent. UA is consistent . Will continue current meds. Advised to wean off. Subjective   SUBJECTIVE/OBJECTIVE:  History of Present Illness    Patient Identification  Martha Beyer is a 27 y.o. female. Patient information was obtained from patient. History/Exam limitations: none. Patient presented to the Emergency Department ambulatory. Chief Complaint   No chief complaint on file. Pt is here today for follow up in our addiction clinic. She is due refill of Suboxone 2 mg-0.5 mg sublingual film place 1.5 films every day by sublingual route as needed for 28 days. No cravings and no withdrawals. She brought in meeting sheet with 11 signatures on it. No allergic reactions, no reactions and able to ADLs. Hakann Kiester is reviewed and consistent. UA in house  is consistent. Past Medical History:  No date: Hepatitis C  History reviewed. No pertinent family history. Current Outpatient Medications:  SUBOXONE 2-0.5 MG FILM SL film, Place 1 Film under the tongue in the morning for 28 days. , Disp: 28 Film, Rfl: 0  gabapentin (NEURONTIN) 300 MG capsule, Take 300 mg by mouth 3 times daily (Patient not taking: Reported on 7/15/2022), Disp: , Rfl:   citalopram (CELEXA) 40 MG tablet, Take 40 mg by mouth daily (Patient not taking:

## 2023-05-24 ENCOUNTER — OFFICE VISIT (OUTPATIENT)
Dept: PAIN MANAGEMENT | Age: 33
End: 2023-05-24
Payer: COMMERCIAL

## 2023-05-24 VITALS
WEIGHT: 150 LBS | HEART RATE: 75 BPM | TEMPERATURE: 97.9 F | BODY MASS INDEX: 23.54 KG/M2 | OXYGEN SATURATION: 100 % | HEIGHT: 67 IN

## 2023-05-24 DIAGNOSIS — F11.20 UNCOMPLICATED OPIOID DEPENDENCE (HCC): ICD-10-CM

## 2023-05-24 PROCEDURE — 80305 DRUG TEST PRSMV DIR OPT OBS: CPT | Performed by: PAIN MEDICINE

## 2023-05-24 PROCEDURE — 99213 OFFICE O/P EST LOW 20 MIN: CPT | Performed by: PAIN MEDICINE

## 2023-05-24 RX ORDER — BUPRENORPHINE HYDROCHLORIDE, NALOXONE HYDROCHLORIDE 2; .5 MG/1; MG/1
1 FILM, SOLUBLE BUCCAL; SUBLINGUAL DAILY
Qty: 28 FILM | Refills: 0 | Status: SHIPPED | OUTPATIENT
Start: 2023-05-24 | End: 2023-06-21

## 2023-05-24 NOTE — PROGRESS NOTES
Vijaya Schneider (:  1990) is a 27 y.o. female,Established patient, here for evaluation of the following chief complaint(s):  Opioid dependence         ASSESSMENT/PLAN:  1. Uncomplicated opioid dependence (HCC)  SUBOXONE 2-0.5 MG FILM SL film; Place 1 Film under the tongue in the morning for 28 days. , Disp-28 Film, R-0, HAY Normal   Pt is here today for follow up in our addiction clinic. She is due refill of Suboxone 2 mg-0.5 mg sublingual film place 1.0  films every day by sublingual route as needed for 28 days. No cravings and no withdrawals. She brought in meeting sheet with 12 signatures on it. No allergic reactions, no reactions and able to ADLs. Neeru Gonzalez is reviewed and consistent. UA is consistent . Will continue current meds. Advised to wean off. Subjective   SUBJECTIVE/OBJECTIVE:  History of Present Illness    Patient Identification  Vijaya Schneider is a 27 y.o. female. Patient information was obtained from patient. History/Exam limitations: none. Patient presented to the Emergency Department ambulatory. Chief Complaint   No chief complaint on file. Pt is here today for follow up in our addiction clinic. She is due refill of Suboxone 2 mg-0.5 mg sublingual film place 1.5 films every day by sublingual route as needed for 28 days. No cravings and no withdrawals. She brought in meeting sheet with 11 signatures on it. No allergic reactions, no reactions and able to ADLs. Neeru Gonzalez is reviewed and consistent. UA in house  is consistent. Past Medical History:  No date: Hepatitis C  History reviewed. No pertinent family history. Current Outpatient Medications:  SUBOXONE 2-0.5 MG FILM SL film, Place 1 Film under the tongue in the morning for 28 days. , Disp: 28 Film, Rfl: 0  gabapentin (NEURONTIN) 300 MG capsule, Take 300 mg by mouth 3 times daily (Patient not taking: Reported on 7/15/2022), Disp: , Rfl:   citalopram (CELEXA) 40 MG tablet, Take 40 mg by mouth daily (Patient not taking:

## 2023-06-21 ENCOUNTER — OFFICE VISIT (OUTPATIENT)
Dept: PAIN MANAGEMENT | Age: 33
End: 2023-06-21
Payer: COMMERCIAL

## 2023-06-21 VITALS
OXYGEN SATURATION: 99 % | TEMPERATURE: 97.6 F | BODY MASS INDEX: 23.54 KG/M2 | DIASTOLIC BLOOD PRESSURE: 78 MMHG | SYSTOLIC BLOOD PRESSURE: 118 MMHG | HEIGHT: 67 IN | HEART RATE: 81 BPM | WEIGHT: 150 LBS

## 2023-06-21 DIAGNOSIS — F11.20 UNCOMPLICATED OPIOID DEPENDENCE (HCC): Primary | ICD-10-CM

## 2023-06-21 PROCEDURE — 99213 OFFICE O/P EST LOW 20 MIN: CPT | Performed by: PAIN MEDICINE

## 2023-06-21 PROCEDURE — 80305 DRUG TEST PRSMV DIR OPT OBS: CPT | Performed by: PAIN MEDICINE

## 2023-06-21 RX ORDER — BUPRENORPHINE HYDROCHLORIDE, NALOXONE HYDROCHLORIDE 2; .5 MG/1; MG/1
1 FILM, SOLUBLE BUCCAL; SUBLINGUAL DAILY
Qty: 28 FILM | Refills: 0 | Status: SHIPPED | OUTPATIENT
Start: 2023-06-21 | End: 2023-07-19

## 2023-06-21 NOTE — PROGRESS NOTES
Tim Gil (:  1990) is a 27 y.o. female,Established patient, here for evaluation of the following chief complaint(s):  Opioid dependence         ASSESSMENT/PLAN:  1. Uncomplicated opioid dependence ([address]  On SUBOXONE 2-0.5 MG FILM SL film; Place 1 Film under the tongue in the morning for 28 days. , Disp-28 Film, R-0, HAY Normal   Pt is here today for follow up in our addiction clinic. No cravings and no withdrawals. She brought in meeting sheet with 12 signatures on it. No allergic reactions, no reactions and able to ADLs. Rebeca Charisse is reviewed and consistent. UA is consistent . Will continue current meds. Advised to wean off. Consider Vivitrol. Subjective   SUBJECTIVE/OBJECTIVE:  History of Present Illness    Patient Identification  Tim Gil is a 27 y.o. female. Patient information was obtained from patient. History/Exam limitations: none. Patient presented to the Emergency Department ambulatory. Chief Complaint   No chief complaint on file. Pt is here today for follow up in our addiction clinic. She is due refill of Suboxone 2 mg-0.5 mg sublingual film place 1.5 films every day by sublingual route as needed for 28 days. No cravings and no withdrawals. She brought in meeting sheet with 11 signatures on it. No allergic reactions, no reactions and able to ADLs. Rebeca Charisse is reviewed and consistent. UA in house  is consistent. Past Medical History:  No date: Hepatitis C  History reviewed. No pertinent family history. Current Outpatient Medications:  SUBOXONE 2-0.5 MG FILM SL film, Place 1 Film under the tongue in the morning for 28 days. , Disp: 28 Film, Rfl: 0  gabapentin (NEURONTIN) 300 MG capsule, Take 300 mg by mouth 3 times daily (Patient not taking: Reported on 7/15/2022), Disp: , Rfl:   citalopram (CELEXA) 40 MG tablet, Take 40 mg by mouth daily (Patient not taking: Reported on 7/15/2022), Disp: , Rfl:     No current facility-administered medications for this visit.     No

## 2023-07-19 ENCOUNTER — OFFICE VISIT (OUTPATIENT)
Dept: PAIN MANAGEMENT | Age: 33
End: 2023-07-19
Payer: COMMERCIAL

## 2023-07-19 VITALS
TEMPERATURE: 97.7 F | DIASTOLIC BLOOD PRESSURE: 72 MMHG | SYSTOLIC BLOOD PRESSURE: 122 MMHG | HEIGHT: 67 IN | WEIGHT: 142 LBS | BODY MASS INDEX: 22.29 KG/M2

## 2023-07-19 DIAGNOSIS — F11.20 UNCOMPLICATED OPIOID DEPENDENCE (HCC): Primary | ICD-10-CM

## 2023-07-19 PROCEDURE — 80305 DRUG TEST PRSMV DIR OPT OBS: CPT | Performed by: PAIN MEDICINE

## 2023-07-19 PROCEDURE — 99213 OFFICE O/P EST LOW 20 MIN: CPT | Performed by: PAIN MEDICINE

## 2023-07-19 RX ORDER — BUPRENORPHINE HYDROCHLORIDE, NALOXONE HYDROCHLORIDE 2; .5 MG/1; MG/1
0.5 FILM, SOLUBLE BUCCAL; SUBLINGUAL DAILY
Qty: 14 FILM | Refills: 0 | Status: SHIPPED | OUTPATIENT
Start: 2023-07-19 | End: 2023-08-16

## 2023-07-19 NOTE — PROGRESS NOTES
Jesika Garcia (:  1990) is a 27 y.o. female,Established patient, here for evaluation of the following chief complaint(s):  Opioid dependence         ASSESSMENT/PLAN:  1. Uncomplicated opioid dependence ([address])  On SUBOXONE 2-0.5 MG FILM SL film; Place 1 Film under the tongue in the morning for 28 days. , Disp-28 Film, R-0, HAY Normal   Pt is here today for follow up in our addiction clinic. No cravings and no withdrawals. She brought in meeting sheet with 12 signatures on it. No allergic reactions, no reactions and able to ADLs. Dorene Camel is reviewed and consistent. UA is consistent . Will continue current meds. Advised to wean off to 1 mg a day. Consider Vivitrol. Subjective   SUBJECTIVE/OBJECTIVE:  History of Present Illness    Patient Identification  Jesika Garcia is a 27 y.o. female. Patient information was obtained from patient. History/Exam limitations: none. Patient presented to the Emergency Department ambulatory. Chief Complaint   No chief complaint on file. Pt is here today for follow up in our addiction clinic. She is due refill of Suboxone 2 mg-0.5 mg sublingual film place 1.5 films every day by sublingual route as needed for 28 days. No cravings and no withdrawals. She brought in meeting sheet with 11 signatures on it. No allergic reactions, no reactions and able to ADLs. Dorene Camel is reviewed and consistent. UA in house  is consistent. Past Medical History:  No date: Hepatitis C  History reviewed. No pertinent family history. Current Outpatient Medications:  SUBOXONE 2-0.5 MG FILM SL film, Place 1 Film under the tongue in the morning for 28 days. , Disp: 28 Film, Rfl: 0  gabapentin (NEURONTIN) 300 MG capsule, Take 300 mg by mouth 3 times daily (Patient not taking: Reported on 7/15/2022), Disp: , Rfl:     No current facility-administered medications for this visit.     No Known Allergies  Social History    Socioeconomic History      Marital status: Single      Spouse name: Not

## 2023-08-15 ENCOUNTER — OFFICE VISIT (OUTPATIENT)
Dept: PAIN MANAGEMENT | Age: 33
End: 2023-08-15

## 2023-08-15 VITALS
HEART RATE: 60 BPM | WEIGHT: 157 LBS | TEMPERATURE: 97.6 F | BODY MASS INDEX: 24.64 KG/M2 | SYSTOLIC BLOOD PRESSURE: 124 MMHG | HEIGHT: 67 IN | DIASTOLIC BLOOD PRESSURE: 84 MMHG | OXYGEN SATURATION: 100 %

## 2023-08-15 DIAGNOSIS — F11.20 UNCOMPLICATED OPIOID DEPENDENCE (HCC): Primary | ICD-10-CM

## 2023-08-15 LAB
ALCOHOL URINE: NEGATIVE
AMPHETAMINE SCREEN, URINE: NEGATIVE
BARBITURATE SCREEN, URINE: NEGATIVE
BENZODIAZEPINE SCREEN, URINE: NEGATIVE
BUPRENORPHINE URINE: POSITIVE
COCAINE METABOLITE SCREEN URINE: NEGATIVE
FENTANYL SCREEN, URINE: NEGATIVE
GABAPENTIN SCREEN, URINE: NEGATIVE
MDMA URINE: NEGATIVE
METHADONE SCREEN, URINE: NEGATIVE
METHAMPHETAMINE, URINE: NEGATIVE
OPIATE SCREEN URINE: NEGATIVE
OXYCODONE SCREEN URINE: NEGATIVE
PHENCYCLIDINE SCREEN URINE: NEGATIVE
PROPOXYPHENE SCREEN, URINE: NEGATIVE
SYNTHETIC CANNABINOIDS(K2) SCREEN, URINE: NEGATIVE
THC SCREEN, URINE: NEGATIVE
TRAMADOL SCREEN URINE: NEGATIVE
TRICYCLIC ANTIDEPRESSANTS, UR: NEGATIVE

## 2023-08-15 RX ORDER — BUPRENORPHINE HYDROCHLORIDE, NALOXONE HYDROCHLORIDE 2; .5 MG/1; MG/1
0.5 FILM, SOLUBLE BUCCAL; SUBLINGUAL DAILY
Qty: 14 FILM | Refills: 0 | Status: SHIPPED | OUTPATIENT
Start: 2023-08-15 | End: 2023-09-12

## 2023-08-15 NOTE — PROGRESS NOTES
Antonino Spivey (:  1990) is a 27 y.o. female,Established patient, here for evaluation of the following chief complaint(s):  Opioid dependence         ASSESSMENT/PLAN:  1. Uncomplicated opioid dependence ([address])  On SUBOXONE 2-0.5 MG FILM SL film; Place 1/2 Film under the tongue in the morning for 14 days. , Disp-14 Film, R-0, HAY Normal   Pt is here today for follow up in our addiction clinic. No cravings and no withdrawals. She brought in meeting sheet with 12 signatures on it. No allergic reactions, no reactions and able to ADLs. Kristen Grieves is reviewed and consistent. UA is consistent . Will continue current meds. Advised to wean off to 1 mg a day. Consider Vivitrol. Subjective   SUBJECTIVE/OBJECTIVE:  History of Present Illness    Patient Identification  Antonino Spivey is a 27 y.o. female. Patient information was obtained from patient. History/Exam limitations: none. Patient presented to the Emergency Department ambulatory. Chief Complaint   No chief complaint on file. Pt is here today for follow up in our addiction clinic. She is due refill of Suboxone 2 mg-0.5 mg sublingual film place 1.5 films every day by sublingual route as needed for 28 days. No cravings and no withdrawals. She brought in meeting sheet with 11 signatures on it. No allergic reactions, no reactions and able to ADLs. Kristen Larryieves is reviewed and consistent. UA in house  is consistent. Past Medical History:  No date: Hepatitis C  History reviewed. No pertinent family history. Current Outpatient Medications:  SUBOXONE 2-0.5 MG FILM SL film, Place 1 Film under the tongue in the morning for 28 days. , Disp: 28 Film, Rfl: 0  gabapentin (NEURONTIN) 300 MG capsule, Take 300 mg by mouth 3 times daily (Patient not taking: Reported on 7/15/2022), Disp: , Rfl:     No current facility-administered medications for this visit.     No Known Allergies  Social History    Socioeconomic History      Marital status: Single      Spouse name:

## 2023-09-12 ENCOUNTER — OFFICE VISIT (OUTPATIENT)
Dept: PAIN MANAGEMENT | Age: 33
End: 2023-09-12
Payer: COMMERCIAL

## 2023-09-12 VITALS
HEART RATE: 95 BPM | WEIGHT: 157 LBS | TEMPERATURE: 98.2 F | HEIGHT: 67 IN | OXYGEN SATURATION: 99 % | BODY MASS INDEX: 24.64 KG/M2

## 2023-09-12 DIAGNOSIS — F11.20 UNCOMPLICATED OPIOID DEPENDENCE (HCC): ICD-10-CM

## 2023-09-12 PROCEDURE — 99213 OFFICE O/P EST LOW 20 MIN: CPT | Performed by: PAIN MEDICINE

## 2023-09-12 RX ORDER — BUPRENORPHINE HYDROCHLORIDE, NALOXONE HYDROCHLORIDE 2; .5 MG/1; MG/1
0.5 FILM, SOLUBLE BUCCAL; SUBLINGUAL DAILY
Qty: 14 FILM | Refills: 0 | Status: SHIPPED | OUTPATIENT
Start: 2023-09-12 | End: 2023-09-12 | Stop reason: SDUPTHER

## 2023-09-12 RX ORDER — BUPRENORPHINE HYDROCHLORIDE, NALOXONE HYDROCHLORIDE 2; .5 MG/1; MG/1
0.5 FILM, SOLUBLE BUCCAL; SUBLINGUAL DAILY
Qty: 14 FILM | Refills: 0 | Status: SHIPPED | OUTPATIENT
Start: 2023-09-12 | End: 2023-09-12

## 2023-09-12 NOTE — PROGRESS NOTES
Noe Rome (:  1990) is a 27 y.o. female,Established patient, here for evaluation of the following chief complaint(s):  Opioid dependence         ASSESSMENT/PLAN:  1. Uncomplicated opioid dependence ([address])  On SUBOXONE 2-0.5 MG FILM SL film; Place 1/2 Film under the tongue in the morning for 14 days. , Disp-14 Film, R-0, HAY Normal   Pt is here today for follow up in our addiction clinic. No cravings and no withdrawals. She brought in meeting sheet with 12 signatures on it. No allergic reactions, no reactions and able to ADLs. Suni Likes is reviewed and consistent. UA is consistent . Will continue current meds. Advised to wean off to 1 mg a day. Consider Vivitrol. Subjective   SUBJECTIVE/OBJECTIVE:  History of Present Illness    Patient Identification  Noe Rome is a 27 y.o. female. Patient information was obtained from patient. History/Exam limitations: none. Patient presented to the Emergency Department ambulatory. Chief Complaint   No chief complaint on file. Pt is here today for follow up in our addiction clinic. She is due refill of Suboxone 2 mg-0.5 mg sublingual film place 1.5 films every day by sublingual route as needed for 28 days. No cravings and no withdrawals. She brought in meeting sheet with 11 signatures on it. No allergic reactions, no reactions and able to ADLs. Suni Likes is reviewed and consistent. UA in house  is consistent. Past Medical History:  No date: Hepatitis C  History reviewed. No pertinent family history. Current Outpatient Medications:  SUBOXONE 2-0.5 MG FILM SL film, Place 1 Film under the tongue in the morning for 28 days. , Disp: 28 Film, Rfl: 0  gabapentin (NEURONTIN) 300 MG capsule, Take 300 mg by mouth 3 times daily (Patient not taking: Reported on 7/15/2022), Disp: , Rfl:     No current facility-administered medications for this visit.     No Known Allergies  Social History    Socioeconomic History      Marital status: Single      Spouse name:

## 2023-10-10 ENCOUNTER — OFFICE VISIT (OUTPATIENT)
Dept: PAIN MANAGEMENT | Age: 33
End: 2023-10-10
Payer: COMMERCIAL

## 2023-10-10 VITALS
TEMPERATURE: 98.3 F | HEART RATE: 86 BPM | DIASTOLIC BLOOD PRESSURE: 86 MMHG | WEIGHT: 150 LBS | HEIGHT: 67 IN | SYSTOLIC BLOOD PRESSURE: 134 MMHG | OXYGEN SATURATION: 99 % | BODY MASS INDEX: 23.54 KG/M2

## 2023-10-10 DIAGNOSIS — F11.20 UNCOMPLICATED OPIOID DEPENDENCE (HCC): Primary | ICD-10-CM

## 2023-10-10 PROCEDURE — 80305 DRUG TEST PRSMV DIR OPT OBS: CPT | Performed by: PAIN MEDICINE

## 2023-10-10 PROCEDURE — 99213 OFFICE O/P EST LOW 20 MIN: CPT | Performed by: PAIN MEDICINE

## 2023-10-10 RX ORDER — BUPRENORPHINE AND NALOXONE 2; .5 MG/1; MG/1
0.5 FILM, SOLUBLE BUCCAL; SUBLINGUAL DAILY
Qty: 14 FILM | Refills: 0 | Status: SHIPPED | OUTPATIENT
Start: 2023-10-10 | End: 2023-11-07

## 2023-11-07 ENCOUNTER — OFFICE VISIT (OUTPATIENT)
Dept: PAIN MANAGEMENT | Age: 33
End: 2023-11-07
Payer: COMMERCIAL

## 2023-11-07 VITALS
SYSTOLIC BLOOD PRESSURE: 124 MMHG | HEIGHT: 67 IN | DIASTOLIC BLOOD PRESSURE: 78 MMHG | TEMPERATURE: 98 F | HEART RATE: 83 BPM | WEIGHT: 150 LBS | BODY MASS INDEX: 23.54 KG/M2 | OXYGEN SATURATION: 100 %

## 2023-11-07 DIAGNOSIS — F11.20 UNCOMPLICATED OPIOID DEPENDENCE (HCC): ICD-10-CM

## 2023-11-07 PROCEDURE — 99213 OFFICE O/P EST LOW 20 MIN: CPT | Performed by: PAIN MEDICINE

## 2023-11-07 PROCEDURE — 80305 DRUG TEST PRSMV DIR OPT OBS: CPT | Performed by: PAIN MEDICINE

## 2023-11-07 RX ORDER — BUPRENORPHINE AND NALOXONE 2; .5 MG/1; MG/1
0.5 FILM, SOLUBLE BUCCAL; SUBLINGUAL DAILY
Qty: 14 FILM | Refills: 0 | Status: SHIPPED | OUTPATIENT
Start: 2023-11-07 | End: 2023-12-05

## 2023-11-07 NOTE — PROGRESS NOTES
Ana Colon (:  1990) is a 27 y.o. female,Established patient, here for evaluation of the following chief complaint(s):  Opioid dependence         ASSESSMENT/PLAN:  1. Uncomplicated opioid dependence ([address])  On SUBOXONE 2-0.5 MG FILM SL film; Place 1/2 Film under the tongue in the morning for 14 days. , Disp-14 Film, R-0, HAY Normal   Pt is here today for follow up in our addiction clinic. No cravings and no withdrawals. She brought in meeting sheet with 12 signatures on it. No allergic reactions, no reactions and able to ADLs. Olam Stack is reviewed and consistent. UA is consistent . Will continue current meds. Weaned off off to 1 mg a day. Consider Vivitrol. Subjective   SUBJECTIVE/OBJECTIVE:  History of Present Illness    Patient Identification  Ana Colon is a 27 y.o. female. Patient information was obtained from patient. History/Exam limitations: none. Patient presented to the Emergency Department ambulatory. Chief Complaint   No chief complaint on file. Pt is here today for follow up in our addiction clinic. She is due refill of Suboxone 2 mg-0.5 mg sublingual film place 1.5 films every day by sublingual route as needed for 28 days. No cravings and no withdrawals. She brought in meeting sheet with 11 signatures on it. No allergic reactions, no reactions and able to ADLs. OARRs is not reviewable today. UA in house  is consistent. Past Medical History:  No date: Hepatitis C  History reviewed. No pertinent family history. Current Outpatient Medications:  SUBOXONE 2-0.5 MG FILM SL film, Place 1 Film under the tongue in the morning for 28 days. , Disp: 28 Film, Rfl: 0  gabapentin (NEURONTIN) 300 MG capsule, Take 300 mg by mouth 3 times daily (Patient not taking: Reported on 7/15/2022), Disp: , Rfl:     No current facility-administered medications for this visit.     No Known Allergies  Social History    Socioeconomic History      Marital status: Single      Spouse name: Not on

## 2023-11-08 ENCOUNTER — TELEPHONE (OUTPATIENT)
Dept: PAIN MANAGEMENT | Age: 33
End: 2023-11-08

## 2023-11-08 NOTE — TELEPHONE ENCOUNTER
Buprenorphine-naloxone (Suboxone) 2-0.5 mg film prior authorization request submitted online ([employer] to [employer]), clinical uploaded, Tony Gay pending.      Key: [id number] - [place] Case ID: [id number] - Rx #: [id number]

## 2023-11-10 NOTE — TELEPHONE ENCOUNTER
Left message on personal voicemail informing patient I just checked [email or web address] and medication authorization is still pending.

## 2023-11-13 NOTE — TELEPHONE ENCOUNTER
Suboxone 2.0.5 mg The request has been approved. The authorization is effective from 11/10/2023 to 11/09/2024, as long as the member is enrolled in their current health plan. The request was reviewed and approved by a licensed clinical pharmacist. A written notification letter will follow with additional details.

## 2023-12-05 ENCOUNTER — OFFICE VISIT (OUTPATIENT)
Dept: PAIN MANAGEMENT | Age: 33
End: 2023-12-05
Payer: COMMERCIAL

## 2023-12-05 VITALS
TEMPERATURE: 98.4 F | BODY MASS INDEX: 23.54 KG/M2 | SYSTOLIC BLOOD PRESSURE: 128 MMHG | WEIGHT: 150 LBS | DIASTOLIC BLOOD PRESSURE: 72 MMHG | OXYGEN SATURATION: 98 % | HEART RATE: 91 BPM | HEIGHT: 67 IN

## 2023-12-05 DIAGNOSIS — F11.20 UNCOMPLICATED OPIOID DEPENDENCE (HCC): ICD-10-CM

## 2023-12-05 PROCEDURE — 80305 DRUG TEST PRSMV DIR OPT OBS: CPT | Performed by: PAIN MEDICINE

## 2023-12-05 PROCEDURE — 99213 OFFICE O/P EST LOW 20 MIN: CPT | Performed by: PAIN MEDICINE

## 2023-12-05 RX ORDER — BUPRENORPHINE AND NALOXONE 2; .5 MG/1; MG/1
0.5 FILM, SOLUBLE BUCCAL; SUBLINGUAL DAILY
Qty: 14 FILM | Refills: 0 | Status: SHIPPED | OUTPATIENT
Start: 2023-12-05 | End: 2024-01-02

## 2023-12-05 NOTE — PROGRESS NOTES
Tyrone Lee (:  1990) is a 27 y.o. female,Established patient, here for evaluation of the following chief complaint(s):  Opioid dependence         ASSESSMENT/PLAN:  1. Uncomplicated opioid dependence ([address])  On SUBOXONE 2-0.5 MG FILM SL film; Place 1/2 Film under the tongue in the morning for 14 days. , Disp-14 Film, R-0, HAY Normal   Pt is here today for follow up in our addiction clinic. No cravings and no withdrawals. She brought in meeting sheet with 12 signatures on it. No allergic reactions, no reactions and able to ADLs. Mittie Seek is reviewed and consistent. UA is consistent . Will continue current meds. Weaned off off to 1 mg a day. She could not come off this month. Consider Vivitrol. Subjective   SUBJECTIVE/OBJECTIVE:  History of Present Illness    Patient Identification  Tyrone Lee is a 27 y.o. female. Patient information was obtained from patient. History/Exam limitations: none. Patient presented to the Emergency Department ambulatory. Chief Complaint   No chief complaint on file. Pt is here today for follow up in our addiction clinic. She is due refill of Suboxone 2 mg-0.5 mg sublingual film place 1/2  films every day by sublingual route as needed for 28 days. No cravings and no withdrawals. She brought in meeting sheet with 11 signatures on it. No allergic reactions, no reactions and able to ADLs. OARRs is not reviewable today. UA in house  is consistent. Past Medical History:  No date: Hepatitis C  History reviewed. No pertinent family history. Current Outpatient Medications:  SUBOXONE 2-0.5 MG FILM SL film, Place 1 Film under the tongue in the morning for 28 days. , Disp: 28 Film, Rfl: 0  gabapentin (NEURONTIN) 300 MG capsule, Take 300 mg by mouth 3 times daily (Patient not taking: Reported on 7/15/2022), Disp: , Rfl:     No current facility-administered medications for this visit.     No Known Allergies  Social History    Socioeconomic History      Marital status:

## 2024-01-02 ENCOUNTER — OFFICE VISIT (OUTPATIENT)
Dept: PAIN MANAGEMENT | Age: 34
End: 2024-01-02
Payer: COMMERCIAL

## 2024-01-02 VITALS
BODY MASS INDEX: 24.64 KG/M2 | HEART RATE: 88 BPM | OXYGEN SATURATION: 98 % | DIASTOLIC BLOOD PRESSURE: 74 MMHG | WEIGHT: 157 LBS | SYSTOLIC BLOOD PRESSURE: 124 MMHG | TEMPERATURE: 98.1 F | HEIGHT: 67 IN

## 2024-01-02 DIAGNOSIS — F11.20 UNCOMPLICATED OPIOID DEPENDENCE (HCC): ICD-10-CM

## 2024-01-02 PROCEDURE — 99213 OFFICE O/P EST LOW 20 MIN: CPT | Performed by: PAIN MEDICINE

## 2024-01-02 PROCEDURE — 80305 DRUG TEST PRSMV DIR OPT OBS: CPT | Performed by: PAIN MEDICINE

## 2024-01-02 RX ORDER — BUPRENORPHINE AND NALOXONE 2; .5 MG/1; MG/1
0.5 FILM, SOLUBLE BUCCAL; SUBLINGUAL DAILY
Qty: 14 FILM | Refills: 0 | Status: SHIPPED | OUTPATIENT
Start: 2024-01-02 | End: 2024-01-30

## 2024-01-30 ENCOUNTER — OFFICE VISIT (OUTPATIENT)
Dept: PAIN MANAGEMENT | Age: 34
End: 2024-01-30
Payer: COMMERCIAL

## 2024-01-30 VITALS
DIASTOLIC BLOOD PRESSURE: 68 MMHG | SYSTOLIC BLOOD PRESSURE: 114 MMHG | HEIGHT: 66 IN | WEIGHT: 157 LBS | BODY MASS INDEX: 25.23 KG/M2 | TEMPERATURE: 97.7 F

## 2024-01-30 DIAGNOSIS — F11.20 UNCOMPLICATED OPIOID DEPENDENCE (HCC): ICD-10-CM

## 2024-01-30 PROCEDURE — 99213 OFFICE O/P EST LOW 20 MIN: CPT | Performed by: PAIN MEDICINE

## 2024-01-30 PROCEDURE — 80305 DRUG TEST PRSMV DIR OPT OBS: CPT | Performed by: PAIN MEDICINE

## 2024-01-30 RX ORDER — BUPRENORPHINE AND NALOXONE 2; .5 MG/1; MG/1
0.5 FILM, SOLUBLE BUCCAL; SUBLINGUAL DAILY
Qty: 14 FILM | Refills: 0 | Status: SHIPPED | OUTPATIENT
Start: 2024-01-30 | End: 2024-02-27

## 2024-01-30 NOTE — PROGRESS NOTES
neck supple.   Skin:     General: Skin is warm.   Neurological:      General: No focal deficit present.      Mental Status: She is alert and oriented to person, place, and time. Mental status is at baseline.   Psychiatric:         Mood and Affect: Mood normal.         Behavior: Behavior normal.         Thought Content: Thought content normal.         Judgment: Judgment normal.      --Delfino Gamboa MD

## 2024-02-27 ENCOUNTER — OFFICE VISIT (OUTPATIENT)
Dept: PAIN MANAGEMENT | Age: 34
End: 2024-02-27
Payer: COMMERCIAL

## 2024-02-27 VITALS
HEIGHT: 67 IN | DIASTOLIC BLOOD PRESSURE: 74 MMHG | SYSTOLIC BLOOD PRESSURE: 128 MMHG | BODY MASS INDEX: 25.11 KG/M2 | TEMPERATURE: 97.9 F | WEIGHT: 160 LBS

## 2024-02-27 DIAGNOSIS — F11.20 UNCOMPLICATED OPIOID DEPENDENCE (HCC): ICD-10-CM

## 2024-02-27 PROCEDURE — 99213 OFFICE O/P EST LOW 20 MIN: CPT | Performed by: PAIN MEDICINE

## 2024-02-27 PROCEDURE — 80305 DRUG TEST PRSMV DIR OPT OBS: CPT | Performed by: PAIN MEDICINE

## 2024-02-27 RX ORDER — BUPRENORPHINE AND NALOXONE 2; .5 MG/1; MG/1
0.5 FILM, SOLUBLE BUCCAL; SUBLINGUAL DAILY
Qty: 10 FILM | Refills: 0 | Status: SHIPPED | OUTPATIENT
Start: 2024-02-27 | End: 2024-03-26

## 2024-02-27 NOTE — PROGRESS NOTES
Suzanna Maria (:  1990) is a 32 y.o. female,Established patient, here for evaluation of the following chief complaint(s):  Opioid dependence         ASSESSMENT/PLAN:  1. Uncomplicated opioid dependence ([facility])  On SUBOXONE 2-0.5 MG FILM SL film; Place 1/2 Film under the tongue in the morning for 14 days., Disp-14 Film, R-0, HAY Normal   Pt is here today for follow up in our addiction clinic.  No cravings and no withdrawals. She brought in meeting sheet with 15 signatures on it. No allergic reactions, no reactions and able to ADLs. OARRs is reviewed and consistent. UA is consistent .  Will continue current meds.Weaned off off to less than 1 mg a day. She could not come off this month. Discussed Vivitrol.         Subjective   SUBJECTIVE/OBJECTIVE:  History of Present Illness    Patient Identification  Suzanna Maria is a 32 y.o. female.    Patient information was obtained from patient.  History/Exam limitations: none.  Patient presented to the Emergency Department ambulatory.    Chief Complaint   No chief complaint on file.    Pt is here today for follow up in our addiction clinic. She is due refill of Suboxone 2 mg-0.5 mg sublingual film place 1/2  films every day by sublingual route as needed for 28 days. No cravings and no withdrawals. She brought in meeting sheet with 11 signatures on it. No allergic reactions, no reactions and able to ADLs.  OARRs is not reviewable today. UA in house  is consistent.    Past Medical History:  No date: Hepatitis C  History reviewed.  No pertinent family history.    Current Outpatient Medications:  SUBOXONE 2-0.5 MG FILM SL film, Place 1 Film under the tongue in the morning for 28 days., Disp: 28 Film, Rfl: 0  gabapentin (NEURONTIN) 300 MG capsule, Take 300 mg by mouth 3 times daily (Patient not taking: Reported on 7/15/2022), Disp: , Rfl:     No current facility-administered medications for this visit.    No Known Allergies  Social History    Socioeconomic History

## 2024-03-27 ENCOUNTER — OFFICE VISIT (OUTPATIENT)
Dept: PAIN MANAGEMENT | Age: 34
End: 2024-03-27
Payer: COMMERCIAL

## 2024-03-27 VITALS
HEART RATE: 87 BPM | DIASTOLIC BLOOD PRESSURE: 76 MMHG | WEIGHT: 160 LBS | BODY MASS INDEX: 25.11 KG/M2 | SYSTOLIC BLOOD PRESSURE: 136 MMHG | HEIGHT: 67 IN | TEMPERATURE: 97.1 F | OXYGEN SATURATION: 100 %

## 2024-03-27 DIAGNOSIS — F11.20 UNCOMPLICATED OPIOID DEPENDENCE (HCC): Primary | ICD-10-CM

## 2024-03-27 PROCEDURE — 99213 OFFICE O/P EST LOW 20 MIN: CPT | Performed by: PAIN MEDICINE

## 2024-03-27 PROCEDURE — 80305 DRUG TEST PRSMV DIR OPT OBS: CPT | Performed by: PAIN MEDICINE

## 2024-03-27 RX ORDER — BUPRENORPHINE AND NALOXONE 2; .5 MG/1; MG/1
0.5 FILM, SOLUBLE BUCCAL; SUBLINGUAL DAILY
Qty: 10 FILM | Refills: 0 | Status: SHIPPED | OUTPATIENT
Start: 2024-03-27 | End: 2024-04-24

## 2024-03-27 NOTE — PROGRESS NOTES
Suzanna Maria (:  1990) is a 32 y.o. female,Established patient, here for evaluation of the following chief complaint(s):  Opioid dependence         ASSESSMENT/PLAN:  1. Uncomplicated opioid dependence (HCC)  On SUBOXONE 2-0.5 MG FILM SL film; Taking1/3 Film under the tongue in the morning for 14 days., Disp-14 Film, R-0, HAY Normal   Pt is here today for follow up in our addiction clinic.  No cravings and no withdrawals. She brought in meeting sheet with 15 signatures on it. No allergic reactions, no reactions and able to ADLs. OARRs is reviewed and consistent. UA is consistent . Will continue current meds.Weaned off off to less than 1 mg a day. She could not come off this month. Discussed Vivitrol.         Subjective   SUBJECTIVE/OBJECTIVE:  History of Present Illness    Patient Identification  Suzanna Maria is a 32 y.o. female.    Patient information was obtained from patient.  History/Exam limitations: none.  Patient presented to the Emergency Department ambulatory.    Chief Complaint   No chief complaint on file.    Pt is here today for follow up in our addiction clinic. She is due refill of Suboxone 2 mg-0.5 mg sublingual film place 1/2  films every day by sublingual route as needed for 28 days. No cravings and no withdrawals. She brought in meeting sheet with 11 signatures on it. No allergic reactions, no reactions and able to ADLs.  OARRs is not reviewable today. UA in house  is consistent.    Past Medical History:  No date: Hepatitis C  History reviewed.  No pertinent family history.    Current Outpatient Medications:  SUBOXONE 2-0.5 MG FILM SL film, Place 1 Film under the tongue in the morning for 28 days., Disp: 28 Film, Rfl: 0  gabapentin (NEURONTIN) 300 MG capsule, Take 300 mg by mouth 3 times daily (Patient not taking: Reported on 7/15/2022), Disp: , Rfl:     No current facility-administered medications for this visit.    No Known Allergies  Social History    Socioeconomic History

## 2024-04-24 ENCOUNTER — OFFICE VISIT (OUTPATIENT)
Dept: PAIN MANAGEMENT | Age: 34
End: 2024-04-24
Payer: COMMERCIAL

## 2024-04-24 VITALS
DIASTOLIC BLOOD PRESSURE: 84 MMHG | HEIGHT: 66 IN | SYSTOLIC BLOOD PRESSURE: 134 MMHG | HEART RATE: 92 BPM | WEIGHT: 160 LBS | BODY MASS INDEX: 25.71 KG/M2 | OXYGEN SATURATION: 100 %

## 2024-04-24 DIAGNOSIS — F11.20 UNCOMPLICATED OPIOID DEPENDENCE (HCC): ICD-10-CM

## 2024-04-24 PROCEDURE — 99213 OFFICE O/P EST LOW 20 MIN: CPT | Performed by: PAIN MEDICINE

## 2024-04-24 PROCEDURE — 80305 DRUG TEST PRSMV DIR OPT OBS: CPT | Performed by: PAIN MEDICINE

## 2024-04-24 RX ORDER — BUPRENORPHINE AND NALOXONE 2; .5 MG/1; MG/1
0.5 FILM, SOLUBLE BUCCAL; SUBLINGUAL DAILY
Qty: 6 FILM | Refills: 0 | Status: SHIPPED | OUTPATIENT
Start: 2024-04-24 | End: 2024-05-22

## 2024-05-08 ENCOUNTER — TELEPHONE (OUTPATIENT)
Dept: PAIN MANAGEMENT | Age: 34
End: 2024-05-08

## 2024-05-08 NOTE — TELEPHONE ENCOUNTER
Pt called and stated she would like to provider to call her back. Did not want to say what it was about just wants a call back from him

## 2024-05-22 ENCOUNTER — OFFICE VISIT (OUTPATIENT)
Dept: PAIN MANAGEMENT | Age: 34
End: 2024-05-22
Payer: COMMERCIAL

## 2024-05-22 VITALS
BODY MASS INDEX: 25.11 KG/M2 | HEIGHT: 67 IN | SYSTOLIC BLOOD PRESSURE: 138 MMHG | WEIGHT: 160 LBS | DIASTOLIC BLOOD PRESSURE: 82 MMHG

## 2024-05-22 DIAGNOSIS — F11.20 UNCOMPLICATED OPIOID DEPENDENCE (HCC): ICD-10-CM

## 2024-05-22 PROCEDURE — 80305 DRUG TEST PRSMV DIR OPT OBS: CPT | Performed by: PAIN MEDICINE

## 2024-05-22 PROCEDURE — 99213 OFFICE O/P EST LOW 20 MIN: CPT | Performed by: PAIN MEDICINE

## 2024-05-22 RX ORDER — BUPRENORPHINE AND NALOXONE 2; .5 MG/1; MG/1
0.5 FILM, SOLUBLE BUCCAL; SUBLINGUAL DAILY
Qty: 6 FILM | Refills: 0 | Status: SHIPPED | OUTPATIENT
Start: 2024-05-22 | End: 2024-06-19

## 2024-06-19 ENCOUNTER — OFFICE VISIT (OUTPATIENT)
Dept: PAIN MANAGEMENT | Age: 34
End: 2024-06-19

## 2024-06-19 VITALS
DIASTOLIC BLOOD PRESSURE: 92 MMHG | BODY MASS INDEX: 25.71 KG/M2 | HEIGHT: 66 IN | WEIGHT: 160 LBS | SYSTOLIC BLOOD PRESSURE: 120 MMHG

## 2024-06-19 DIAGNOSIS — F11.20 UNCOMPLICATED OPIOID DEPENDENCE (HCC): ICD-10-CM

## 2024-06-19 LAB
ALCOHOL URINE: ABNORMAL
AMPHETAMINE SCREEN, URINE: ABNORMAL
BARBITURATE SCREEN, URINE: ABNORMAL
BENZODIAZEPINE SCREEN, URINE: ABNORMAL
BUPRENORPHINE URINE: ABNORMAL
COCAINE METABOLITE SCREEN URINE: ABNORMAL
FENTANYL SCREEN, URINE: ABNORMAL
GABAPENTIN SCREEN, URINE: ABNORMAL
MDMA URINE: ABNORMAL
METHADONE SCREEN, URINE: ABNORMAL
METHAMPHETAMINE, URINE: ABNORMAL
OPIATE SCREEN URINE: ABNORMAL
OXYCODONE SCREEN URINE: ABNORMAL
PHENCYCLIDINE SCREEN URINE: ABNORMAL
PROPOXYPHENE SCREEN, URINE: ABNORMAL
SYNTHETIC CANNABINOIDS(K2) SCREEN, URINE: ABNORMAL
THC SCREEN, URINE: ABNORMAL
TRAMADOL SCREEN URINE: ABNORMAL
TRICYCLIC ANTIDEPRESSANTS, UR: ABNORMAL

## 2024-06-19 RX ORDER — BUPRENORPHINE AND NALOXONE 2; .5 MG/1; MG/1
0.5 FILM, SOLUBLE BUCCAL; SUBLINGUAL DAILY
Qty: 6 FILM | Refills: 0 | Status: SHIPPED | OUTPATIENT
Start: 2024-06-19 | End: 2024-07-17

## 2024-07-17 ENCOUNTER — OFFICE VISIT (OUTPATIENT)
Dept: PAIN MANAGEMENT | Age: 34
End: 2024-07-17
Payer: COMMERCIAL

## 2024-07-17 VITALS
SYSTOLIC BLOOD PRESSURE: 122 MMHG | WEIGHT: 160 LBS | BODY MASS INDEX: 25.71 KG/M2 | HEIGHT: 66 IN | DIASTOLIC BLOOD PRESSURE: 86 MMHG

## 2024-07-17 DIAGNOSIS — F11.20 UNCOMPLICATED OPIOID DEPENDENCE (HCC): Primary | ICD-10-CM

## 2024-07-17 PROCEDURE — 80305 DRUG TEST PRSMV DIR OPT OBS: CPT | Performed by: PAIN MEDICINE

## 2024-07-17 PROCEDURE — 99213 OFFICE O/P EST LOW 20 MIN: CPT | Performed by: PAIN MEDICINE

## 2024-07-17 RX ORDER — BUPRENORPHINE AND NALOXONE 2; .5 MG/1; MG/1
0.5 FILM, SOLUBLE BUCCAL; SUBLINGUAL DAILY
Qty: 6 FILM | Refills: 0 | Status: SHIPPED | OUTPATIENT
Start: 2024-07-17 | End: 2024-08-14

## 2024-07-17 NOTE — PROGRESS NOTES
Suzanna Marai (:  1990) is a 32 y.o. female,Established patient, here for evaluation of the following chief complaint(s):  Opioid dependence         ASSESSMENT/PLAN:  1. Uncomplicated opioid dependence (HCC)  On SUBOXONE 2-0.5 MG FILM SL film; Taking1/3 Film under the tongue in the morning for 14 days., Disp-14 Film, R-0, HAY Normal   Pt is here today for follow up in our addiction clinic.  No cravings and no withdrawals. She brought in meeting sheet with 15 signatures on it. No allergic reactions, no reactions and able to ADLs. OARRs is reviewed and consistent. UA is consistent . Will continue current meds.Weaned off off to less than 1 mg a day. She could not come off this month. Discussed Vivitrol.         Subjective   SUBJECTIVE/OBJECTIVE:  History of Present Illness    Patient Identification  Suzanna Maria is a 32 y.o. female.    Patient information was obtained from patient.  History/Exam limitations: none.  Patient presented to the Emergency Department ambulatory.    Chief Complaint   No chief complaint on file.    Pt is here today for follow up in our addiction clinic. She is due refill of Suboxone 2 mg-0.5 mg sublingual film place 1/2  films every day by sublingual route as needed for 28 days. No cravings and no withdrawals. She brought in meeting sheet with 11 signatures on it. No allergic reactions, no reactions and able to ADLs.  OARRs is not reviewable today. UA in house  is consistent.    Past Medical History:  No date: Hepatitis C  History reviewed.  No pertinent family history.    Current Outpatient Medications:  SUBOXONE 2-0.5 MG FILM SL film, Place 1 Film under the tongue in the morning for 28 days., Disp: 28 Film, Rfl: 0  gabapentin (NEURONTIN) 300 MG capsule, Take 300 mg by mouth 3 times daily (Patient not taking: Reported on 7/15/2022), Disp: , Rfl:     No current facility-administered medications for this visit.    No Known Allergies  Social History    Socioeconomic History

## 2024-08-14 ENCOUNTER — OFFICE VISIT (OUTPATIENT)
Dept: PAIN MANAGEMENT | Age: 34
End: 2024-08-14

## 2024-08-14 VITALS
SYSTOLIC BLOOD PRESSURE: 118 MMHG | BODY MASS INDEX: 22.73 KG/M2 | TEMPERATURE: 97.6 F | DIASTOLIC BLOOD PRESSURE: 78 MMHG | WEIGHT: 150 LBS | HEIGHT: 68 IN

## 2024-08-14 DIAGNOSIS — F11.20 UNCOMPLICATED OPIOID DEPENDENCE (HCC): Primary | ICD-10-CM

## 2024-08-14 LAB
ALCOHOL URINE: NORMAL
AMPHETAMINE SCREEN URINE: NORMAL
BARBITURATE SCREEN URINE: NORMAL
BENZODIAZEPINE SCREEN, URINE: NORMAL
BUPRENORPHINE URINE: NORMAL
COCAINE METABOLITE SCREEN URINE: NORMAL
FENTANYL SCREEN, URINE: NORMAL
GABAPENTIN SCREEN, URINE: NORMAL
MDMA, URINE: NORMAL
METHADONE SCREEN, URINE: NORMAL
METHAMPHETAMINE, URINE: NORMAL
OPIATE SCREEN URINE: NORMAL
OXYCODONE SCREEN URINE: NORMAL
PHENCYCLIDINE SCREEN URINE: NORMAL
PROPOXYPHENE SCREEN, URINE: NORMAL
SYNTHETIC CANNABINOIDS(K2) SCREEN, URINE: NORMAL
THC SCREEN, URINE: NORMAL
TRAMADOL SCREEN URINE: NORMAL
TRICYCLIC ANTIDEPRESSANTS, UR: NORMAL

## 2024-08-14 RX ORDER — BUPRENORPHINE AND NALOXONE 2; .5 MG/1; MG/1
0.5 FILM, SOLUBLE BUCCAL; SUBLINGUAL DAILY
Qty: 6 FILM | Refills: 0 | Status: SHIPPED | OUTPATIENT
Start: 2024-08-14 | End: 2024-09-11

## 2024-09-10 ENCOUNTER — OFFICE VISIT (OUTPATIENT)
Age: 34
End: 2024-09-10
Payer: COMMERCIAL

## 2024-09-10 VITALS
BODY MASS INDEX: 22.73 KG/M2 | SYSTOLIC BLOOD PRESSURE: 136 MMHG | TEMPERATURE: 97.7 F | HEIGHT: 68 IN | WEIGHT: 150 LBS | DIASTOLIC BLOOD PRESSURE: 80 MMHG

## 2024-09-10 DIAGNOSIS — F11.20 UNCOMPLICATED OPIOID DEPENDENCE (HCC): ICD-10-CM

## 2024-09-10 PROCEDURE — 99213 OFFICE O/P EST LOW 20 MIN: CPT | Performed by: PAIN MEDICINE

## 2024-09-10 PROCEDURE — 80305 DRUG TEST PRSMV DIR OPT OBS: CPT | Performed by: PAIN MEDICINE

## 2024-09-10 PROCEDURE — 99213 OFFICE O/P EST LOW 20 MIN: CPT

## 2024-09-10 RX ORDER — BUPRENORPHINE AND NALOXONE 2; .5 MG/1; MG/1
0.5 FILM, SOLUBLE BUCCAL; SUBLINGUAL DAILY
Qty: 6 FILM | Refills: 0 | Status: SHIPPED | OUTPATIENT
Start: 2024-09-10 | End: 2024-10-08

## 2024-10-08 ENCOUNTER — OFFICE VISIT (OUTPATIENT)
Age: 34
End: 2024-10-08
Payer: COMMERCIAL

## 2024-10-08 VITALS
SYSTOLIC BLOOD PRESSURE: 142 MMHG | TEMPERATURE: 97.9 F | DIASTOLIC BLOOD PRESSURE: 88 MMHG | WEIGHT: 150 LBS | HEIGHT: 66 IN | BODY MASS INDEX: 24.11 KG/M2

## 2024-10-08 DIAGNOSIS — F11.20 UNCOMPLICATED OPIOID DEPENDENCE (HCC): ICD-10-CM

## 2024-10-08 LAB
ALCOHOL URINE: ABNORMAL
AMPHETAMINE SCREEN URINE: ABNORMAL
BARBITURATE SCREEN URINE: ABNORMAL
BENZODIAZEPINE SCREEN, URINE: ABNORMAL
BUPRENORPHINE URINE: ABNORMAL
COCAINE METABOLITE SCREEN URINE: ABNORMAL
FENTANYL SCREEN, URINE: ABNORMAL
GABAPENTIN SCREEN, URINE: ABNORMAL
MDMA, URINE: ABNORMAL
METHADONE SCREEN, URINE: ABNORMAL
METHAMPHETAMINE, URINE: ABNORMAL
OPIATE SCREEN URINE: ABNORMAL
OXYCODONE SCREEN URINE: ABNORMAL
PHENCYCLIDINE SCREEN URINE: ABNORMAL
PROPOXYPHENE SCREEN, URINE: ABNORMAL
SYNTHETIC CANNABINOIDS(K2) SCREEN, URINE: ABNORMAL
THC SCREEN, URINE: ABNORMAL
TRAMADOL SCREEN URINE: ABNORMAL
TRICYCLIC ANTIDEPRESSANTS, UR: ABNORMAL

## 2024-10-08 PROCEDURE — 99212 OFFICE O/P EST SF 10 MIN: CPT

## 2024-10-08 PROCEDURE — 99213 OFFICE O/P EST LOW 20 MIN: CPT | Performed by: PAIN MEDICINE

## 2024-10-08 PROCEDURE — 80305 DRUG TEST PRSMV DIR OPT OBS: CPT | Performed by: PAIN MEDICINE

## 2024-10-08 RX ORDER — BUPRENORPHINE AND NALOXONE 2; .5 MG/1; MG/1
0.5 FILM, SOLUBLE BUCCAL; SUBLINGUAL DAILY
Qty: 6 FILM | Refills: 0 | Status: SHIPPED | OUTPATIENT
Start: 2024-10-08 | End: 2024-11-05

## 2024-10-08 NOTE — PROGRESS NOTES
Normal range of motion.      Cervical back: Normal range of motion and neck supple.   Skin:     General: Skin is warm.   Neurological:      General: No focal deficit present.      Mental Status: She is alert and oriented to person, place, and time. Mental status is at baseline.   Psychiatric:         Mood and Affect: Mood normal.         Behavior: Behavior normal.         Thought Content: Thought content normal.         Judgment: Judgment normal.      --Delfino Gamboa MD

## 2024-11-04 ENCOUNTER — OFFICE VISIT (OUTPATIENT)
Age: 34
End: 2024-11-04
Payer: COMMERCIAL

## 2024-11-04 VITALS
HEIGHT: 67 IN | DIASTOLIC BLOOD PRESSURE: 86 MMHG | BODY MASS INDEX: 25.11 KG/M2 | WEIGHT: 160 LBS | TEMPERATURE: 97 F | SYSTOLIC BLOOD PRESSURE: 134 MMHG

## 2024-11-04 DIAGNOSIS — F11.20 UNCOMPLICATED OPIOID DEPENDENCE (HCC): Primary | ICD-10-CM

## 2024-11-04 PROCEDURE — 99213 OFFICE O/P EST LOW 20 MIN: CPT | Performed by: PAIN MEDICINE

## 2024-11-04 PROCEDURE — 80305 DRUG TEST PRSMV DIR OPT OBS: CPT | Performed by: PAIN MEDICINE

## 2024-11-04 RX ORDER — BUPRENORPHINE AND NALOXONE 2; .5 MG/1; MG/1
0.5 FILM, SOLUBLE BUCCAL; SUBLINGUAL DAILY
Qty: 6 FILM | Refills: 0 | Status: SHIPPED | OUTPATIENT
Start: 2024-11-04 | End: 2024-12-02

## 2024-11-26 ENCOUNTER — TELEPHONE (OUTPATIENT)
Age: 34
End: 2024-11-26

## 2024-12-03 ENCOUNTER — OFFICE VISIT (OUTPATIENT)
Age: 34
End: 2024-12-03
Payer: COMMERCIAL

## 2024-12-03 VITALS
HEIGHT: 67 IN | DIASTOLIC BLOOD PRESSURE: 72 MMHG | TEMPERATURE: 97.4 F | BODY MASS INDEX: 25.11 KG/M2 | SYSTOLIC BLOOD PRESSURE: 124 MMHG | WEIGHT: 160 LBS

## 2024-12-03 DIAGNOSIS — F11.20 UNCOMPLICATED OPIOID DEPENDENCE (HCC): ICD-10-CM

## 2024-12-03 LAB
ALCOHOL URINE: NEGATIVE
AMPHETAMINE SCREEN URINE: NORMAL
BARBITURATE SCREEN URINE: NEGATIVE
BENZODIAZEPINE SCREEN, URINE: NEGATIVE
BUPRENORPHINE URINE: POSITIVE
COCAINE METABOLITE SCREEN URINE: NEGATIVE
FENTANYL SCREEN, URINE: NEGATIVE
GABAPENTIN SCREEN, URINE: NEGATIVE
MDMA, URINE: NEGATIVE
METHADONE SCREEN, URINE: NEGATIVE
METHAMPHETAMINE, URINE: NEGATIVE
OPIATE SCREEN URINE: NEGATIVE
OXYCODONE SCREEN URINE: NEGATIVE
PHENCYCLIDINE SCREEN URINE: NEGATIVE
PROPOXYPHENE SCREEN, URINE: NEGATIVE
SYNTHETIC CANNABINOIDS(K2) SCREEN, URINE: NEGATIVE
THC SCREEN, URINE: NEGATIVE
TRAMADOL SCREEN URINE: NEGATIVE
TRICYCLIC ANTIDEPRESSANTS, UR: NEGATIVE

## 2024-12-03 PROCEDURE — 99213 OFFICE O/P EST LOW 20 MIN: CPT | Performed by: PAIN MEDICINE

## 2024-12-03 PROCEDURE — 80305 DRUG TEST PRSMV DIR OPT OBS: CPT | Performed by: PAIN MEDICINE

## 2024-12-03 RX ORDER — BUPRENORPHINE AND NALOXONE 2; .5 MG/1; MG/1
0.5 FILM, SOLUBLE BUCCAL; SUBLINGUAL DAILY
Qty: 6 FILM | Refills: 0 | Status: SHIPPED | OUTPATIENT
Start: 2024-12-03 | End: 2024-12-31

## 2024-12-03 NOTE — PROGRESS NOTES
Suzanna Maria (:  1990) is a 32 y.o. female,Established patient, here for evaluation of the following chief complaint(s):  Opioid dependence         ASSESSMENT/PLAN:  1. Uncomplicated opioid dependence (HCC)  On SUBOXONE 2-0.5 MG FILM SL film; Taking1/3 Film under the tongue in the morning f,   Pt is here today for follow up in our addiction clinic.  No cravings and no withdrawals.   No allergic reactions, no reactions and able to ADLs. OARRs is reviewed and consistent. UA is not showing Suboxone but she is not taking it daily only 6 tabs in 24 days. Will continue current meds.Weaned off off to less than 1 mg a day. She could not come off this month. Discussed Vivitrol.         Subjective   SUBJECTIVE/OBJECTIVE:  History of Present Illness    Patient Identification  Suzanna Maria is a 32 y.o. female.    Patient information was obtained from patient.  History/Exam limitations: none.  Patient presented to the Emergency Department ambulatory.    Chief Complaint   No chief complaint on file.    Pt is here today for follow up in our addiction clinic. She is due refill of Suboxone 2 mg-0.5 mg sublingual film place 1/2  films every day by sublingual route as needed for 28 days. No cravings and no withdrawals. She brought in meeting sheet with 11 signatures on it. No allergic reactions, no reactions and able to ADLs.  OARRs is not reviewable today. UA in house  is consistent.    Past Medical History:  No date: Hepatitis C  History reviewed.  No pertinent family history.    Current Outpatient Medications:  SUBOXONE 2-0.5 MG FILM SL film, Place 1 Film under the tongue in the morning for 28 days., Disp: 28 Film, Rfl: 0  gabapentin (NEURONTIN) 300 MG capsule, Take 300 mg by mouth 3 times daily (Patient not taking: Reported on 7/15/2022), Disp: , Rfl:     No current facility-administered medications for this visit.    No Known Allergies  Social History    Socioeconomic History      Marital status: Single      Spouse

## 2024-12-12 ENCOUNTER — TELEPHONE (OUTPATIENT)
Age: 34
End: 2024-12-12

## 2024-12-12 NOTE — TELEPHONE ENCOUNTER
PATIENT LEFT VOICEMAIL CHECKING ON THE PRIOR AUTH FOR HER SUBOXONE.    EMAIL SENT TO JUAN FOR UPDATE.

## 2024-12-12 NOTE — TELEPHONE ENCOUNTER
Tried to submit prior authorization request for Suboxone 2-0.5 mg film online ([employer]ia [employer] to [employer]).  The site needs office notes.  Dr. Gamboa has Rafaela tirado [facility] to confirm they need the notes.  If they say yes, he will print notes and have them sent to me.  Prior authorization request cannot be submitted until we find out if notes are available.      Per [employer], \"ATTENTION: Failure to submit appropriate documentation may result in a coverage denial. Password protected documents are NOT permitted. Please use .jpg, .pdf, .png or .tif file format. \"    (Key: [id number])  PA Case ID #: [id number]  Rx #: [id number]

## 2024-12-12 NOTE — TELEPHONE ENCOUNTER
Suboxone 2-0.5 mg film prior authorization request submitted online ([employer] to [employer]), clinical uploaded, auth pending.     (Key: [id number])  PA Case ID #: [id number]  Rx #: [id number]

## 2024-12-20 DIAGNOSIS — F11.20 UNCOMPLICATED OPIOID DEPENDENCE (HCC): ICD-10-CM

## 2024-12-20 RX ORDER — BUPRENORPHINE HYDROCHLORIDE, NALOXONE HYDROCHLORIDE 2; .5 MG/1; MG/1
FILM, SOLUBLE BUCCAL; SUBLINGUAL
Qty: 6 FILM | Refills: 0 | OUTPATIENT
Start: 2024-12-20

## 2024-12-31 ENCOUNTER — OFFICE VISIT (OUTPATIENT)
Age: 34
End: 2024-12-31
Payer: COMMERCIAL

## 2024-12-31 VITALS
DIASTOLIC BLOOD PRESSURE: 88 MMHG | WEIGHT: 160 LBS | SYSTOLIC BLOOD PRESSURE: 134 MMHG | BODY MASS INDEX: 25.71 KG/M2 | TEMPERATURE: 97.4 F | HEIGHT: 66 IN

## 2024-12-31 DIAGNOSIS — F11.20 UNCOMPLICATED OPIOID DEPENDENCE (HCC): ICD-10-CM

## 2024-12-31 PROCEDURE — 99213 OFFICE O/P EST LOW 20 MIN: CPT | Performed by: PAIN MEDICINE

## 2024-12-31 PROCEDURE — 80305 DRUG TEST PRSMV DIR OPT OBS: CPT | Performed by: PAIN MEDICINE

## 2024-12-31 RX ORDER — BUPRENORPHINE AND NALOXONE 2; .5 MG/1; MG/1
0.5 FILM, SOLUBLE BUCCAL; SUBLINGUAL DAILY
Qty: 6 FILM | Refills: 0 | Status: SHIPPED | OUTPATIENT
Start: 2024-12-31 | End: 2025-01-28

## 2024-12-31 NOTE — PROGRESS NOTES
Neurological:      General: No focal deficit present.      Mental Status: She is alert and oriented to person, place, and time. Mental status is at baseline.   Psychiatric:         Mood and Affect: Mood normal.         Behavior: Behavior normal.         Thought Content: Thought content normal.         Judgment: Judgment normal.      --Delfino Gamboa MD

## 2025-01-28 ENCOUNTER — OFFICE VISIT (OUTPATIENT)
Age: 35
End: 2025-01-28
Payer: COMMERCIAL

## 2025-01-28 VITALS
DIASTOLIC BLOOD PRESSURE: 90 MMHG | BODY MASS INDEX: 25.07 KG/M2 | WEIGHT: 156 LBS | TEMPERATURE: 96.4 F | HEIGHT: 66 IN | SYSTOLIC BLOOD PRESSURE: 130 MMHG

## 2025-01-28 DIAGNOSIS — F11.20 UNCOMPLICATED OPIOID DEPENDENCE (HCC): Primary | ICD-10-CM

## 2025-01-28 PROCEDURE — 99213 OFFICE O/P EST LOW 20 MIN: CPT | Performed by: PAIN MEDICINE

## 2025-01-28 PROCEDURE — 80305 DRUG TEST PRSMV DIR OPT OBS: CPT | Performed by: PAIN MEDICINE

## 2025-01-28 RX ORDER — BUPRENORPHINE AND NALOXONE 2; .5 MG/1; MG/1
0.5 FILM, SOLUBLE BUCCAL; SUBLINGUAL DAILY
Qty: 6 FILM | Refills: 0 | Status: SHIPPED | OUTPATIENT
Start: 2025-01-28 | End: 2025-02-25

## 2025-01-28 NOTE — PROGRESS NOTES
Suzanna Maria (:  1990) is a 32 y.o. female,Established patient, here for evaluation of the following chief complaint(s):  Opioid dependence         ASSESSMENT/PLAN:  1. Uncomplicated opioid dependence (HCC)  On SUBOXONE 2-0.5 MG FILM SL film; Taking 1/3 Film under the tongue in the morning    Pt is here today for follow up in our addiction clinic.  No cravings and no withdrawals.   No allergic reactions, no reactions and able to ADLs. OARRs is reviewed and consistent. UA is consistent. Will continue current meds.Weaned off off to less than 1 mg a day. Discussed Vivitrol.         Subjective   SUBJECTIVE/OBJECTIVE:  History of Present Illness    Patient Identification  Suzanna Maria is a 32 y.o. female.    Patient information was obtained from patient.  History/Exam limitations: none.  Patient presented to the Emergency Department ambulatory.    Chief Complaint   No chief complaint on file.    Pt is here today for follow up in our addiction clinic. She is due refill of Suboxone 2 mg-0.5 mg sublingual film place 1/2  films every day by sublingual route as needed for 28 days. No cravings and no withdrawals. She brought in meeting sheet with 11 signatures on it. No allergic reactions, no reactions and able to ADLs.  OARRs is not reviewable today. UA in house  is consistent.    Past Medical History:  No date: Hepatitis C  History reviewed.  No pertinent family history.    Current Outpatient Medications:  SUBOXONE 2-0.5 MG FILM SL film, Place 1 Film under the tongue in the morning for 28 days., Disp: 28 Film, Rfl: 0  gabapentin (NEURONTIN) 300 MG capsule, Take 300 mg by mouth 3 times daily (Patient not taking: Reported on 7/15/2022), Disp: , Rfl:     No current facility-administered medications for this visit.    No Known Allergies  Social History    Socioeconomic History      Marital status: Single      Spouse name: Not on file      Number of children: Not on file      Years of education: Not on file

## 2025-02-25 ENCOUNTER — OFFICE VISIT (OUTPATIENT)
Age: 35
End: 2025-02-25
Payer: COMMERCIAL

## 2025-02-25 VITALS — SYSTOLIC BLOOD PRESSURE: 130 MMHG | TEMPERATURE: 98.6 F | DIASTOLIC BLOOD PRESSURE: 80 MMHG

## 2025-02-25 DIAGNOSIS — F11.20 UNCOMPLICATED OPIOID DEPENDENCE (HCC): ICD-10-CM

## 2025-02-25 PROCEDURE — 99212 OFFICE O/P EST SF 10 MIN: CPT | Performed by: PAIN MEDICINE

## 2025-02-25 PROCEDURE — 99213 OFFICE O/P EST LOW 20 MIN: CPT | Performed by: PAIN MEDICINE

## 2025-02-25 PROCEDURE — 80305 DRUG TEST PRSMV DIR OPT OBS: CPT | Performed by: PAIN MEDICINE

## 2025-02-25 RX ORDER — BUPRENORPHINE AND NALOXONE 2; .5 MG/1; MG/1
0.5 FILM, SOLUBLE BUCCAL; SUBLINGUAL DAILY
Qty: 6 FILM | Refills: 0 | Status: SHIPPED | OUTPATIENT
Start: 2025-02-25 | End: 2025-03-25

## 2025-03-25 ENCOUNTER — OFFICE VISIT (OUTPATIENT)
Age: 35
End: 2025-03-25
Payer: COMMERCIAL

## 2025-03-25 VITALS
TEMPERATURE: 98.3 F | WEIGHT: 160 LBS | DIASTOLIC BLOOD PRESSURE: 80 MMHG | SYSTOLIC BLOOD PRESSURE: 120 MMHG | BODY MASS INDEX: 25.11 KG/M2 | HEIGHT: 67 IN

## 2025-03-25 DIAGNOSIS — F11.20 UNCOMPLICATED OPIOID DEPENDENCE (HCC): Primary | ICD-10-CM

## 2025-03-25 PROCEDURE — 99213 OFFICE O/P EST LOW 20 MIN: CPT | Performed by: PAIN MEDICINE

## 2025-03-25 PROCEDURE — 99211 OFF/OP EST MAY X REQ PHY/QHP: CPT | Performed by: PAIN MEDICINE

## 2025-03-25 PROCEDURE — 80305 DRUG TEST PRSMV DIR OPT OBS: CPT | Performed by: PAIN MEDICINE

## 2025-03-25 RX ORDER — BUPRENORPHINE AND NALOXONE 2; .5 MG/1; MG/1
0.5 FILM, SOLUBLE BUCCAL; SUBLINGUAL DAILY
Qty: 6 FILM | Refills: 0 | Status: SHIPPED | OUTPATIENT
Start: 2025-03-25 | End: 2025-04-22

## 2025-03-25 NOTE — PROGRESS NOTES
Suzanna Maria (:  1990) is a 32 y.o. female,Established patient, here for evaluation of the following chief complaint(s):  Opioid dependence         ASSESSMENT/PLAN:  1. Uncomplicated opioid dependence (HCC)  On SUBOXONE 2-0.5 MG FILM SL film; Taking 1/3 Film under the tongue in the morning    Pt is here today for follow up in our addiction clinic.  No cravings and no withdrawals.   No allergic reactions, no reactions and able to ADLs. OARRs is reviewed and consistent. UA is consistent. Will continue current meds.Weaned off off to less than 1 mg a day. Discussed Vivitrol.         Subjective   SUBJECTIVE/OBJECTIVE:  History of Present Illness    Patient Identification  Suzanna Maria is a 32 y.o. female.    Patient information was obtained from patient.  History/Exam limitations: none.  Patient presented to the Emergency Department ambulatory.    Chief Complaint   No chief complaint on file.    Pt is here today for follow up in our addiction clinic. She is due refill of Suboxone 2 mg-0.5 mg sublingual film place 1/2  films every day by sublingual route as needed for 28 days. No cravings and no withdrawals. She brought in meeting sheet with 11 signatures on it. No allergic reactions, no reactions and able to ADLs.  OARRs is consistent  today. UA in house  is consistent.    Past Medical History:  No date: Hepatitis C  History reviewed.  No pertinent family history.    Current Outpatient Medications:  SUBOXONE 2-0.5 MG FILM SL film, Place 1 Film under the tongue in the morning for 28 days., Disp: 28 Film, Rfl: 0  gabapentin (NEURONTIN) 300 MG capsule, Take 300 mg by mouth 3 times daily (Patient not taking: Reported on 7/15/2022), Disp: , Rfl:     No current facility-administered medications for this visit.    No Known Allergies  Social History    Socioeconomic History      Marital status: Single      Spouse name: Not on file      Number of children: Not on file      Years of education: Not on file      Highest

## 2025-04-22 ENCOUNTER — OFFICE VISIT (OUTPATIENT)
Age: 35
End: 2025-04-22
Payer: COMMERCIAL

## 2025-04-22 VITALS
BODY MASS INDEX: 25.11 KG/M2 | WEIGHT: 160 LBS | OXYGEN SATURATION: 99 % | HEART RATE: 67 BPM | HEIGHT: 67 IN | TEMPERATURE: 97.7 F

## 2025-04-22 DIAGNOSIS — F11.20 UNCOMPLICATED OPIOID DEPENDENCE (HCC): Primary | ICD-10-CM

## 2025-04-22 PROCEDURE — 99213 OFFICE O/P EST LOW 20 MIN: CPT | Performed by: PAIN MEDICINE

## 2025-04-22 PROCEDURE — 80305 DRUG TEST PRSMV DIR OPT OBS: CPT | Performed by: PAIN MEDICINE

## 2025-04-22 RX ORDER — BUPRENORPHINE AND NALOXONE 2; .5 MG/1; MG/1
0.5 FILM, SOLUBLE BUCCAL; SUBLINGUAL DAILY
Qty: 6 FILM | Refills: 0 | Status: SHIPPED | OUTPATIENT
Start: 2025-04-22 | End: 2025-05-20

## 2025-04-22 NOTE — PROGRESS NOTES
Suzanna Maria (:  1990) is a 32 y.o. female,Established patient, here for evaluation of the following chief complaint(s):  Opioid dependence         ASSESSMENT/PLAN:  1. Uncomplicated opioid dependence (HCC)  On SUBOXONE 2-0.5 MG FILM SL film; Taking 1/3 Film under the tongue in the morning    Pt is here today for follow up in our addiction clinic.  No cravings and no withdrawals.   No allergic reactions, no reactions and able to ADLs. OARRs is reviewed and consistent. UA is consistent. Will continue current meds.Weaned off off to less than 1 mg a day. Discussed Vivitrol.  Had an ER Visit yesterday found C6-7 level, mild broad-based disc bulge.         Subjective   SUBJECTIVE/OBJECTIVE:  History of Present Illness    Patient Identification  Suzanna Maria is a 32 y.o. female.    Patient information was obtained from patient.  History/Exam limitations: none.  Patient presented to the Emergency Department ambulatory.    Chief Complaint   No chief complaint on file.    Pt is here today for follow up in our addiction clinic. She is due refill of Suboxone 2 mg-0.5 mg sublingual film place 1/2  films every day by sublingual route as needed for 28 days. No cravings and no withdrawals. She brought in meeting sheet with 11 signatures on it. No allergic reactions, no reactions and able to ADLs.  OARRs is consistent  today. UA in house  is consistent.    Past Medical History:  No date: Hepatitis C  History reviewed.  No pertinent family history.    Current Outpatient Medications:  SUBOXONE 2-0.5 MG FILM SL film, Place 1 Film under the tongue in the morning for 28 days., Disp: 28 Film, Rfl: 0  gabapentin (NEURONTIN) 300 MG capsule, Take 300 mg by mouth 3 times daily (Patient not taking: Reported on 7/15/2022), Disp: , Rfl:     No current facility-administered medications for this visit.    No Known Allergies  Social History    Socioeconomic History      Marital status: Single      Spouse name: Not on file      Number

## 2025-05-21 ENCOUNTER — OFFICE VISIT (OUTPATIENT)
Age: 35
End: 2025-05-21
Payer: COMMERCIAL

## 2025-05-21 VITALS
WEIGHT: 160 LBS | SYSTOLIC BLOOD PRESSURE: 170 MMHG | DIASTOLIC BLOOD PRESSURE: 100 MMHG | BODY MASS INDEX: 25.11 KG/M2 | OXYGEN SATURATION: 99 % | TEMPERATURE: 97.8 F | HEART RATE: 88 BPM | HEIGHT: 67 IN

## 2025-05-21 DIAGNOSIS — F11.20 UNCOMPLICATED OPIOID DEPENDENCE (HCC): Primary | ICD-10-CM

## 2025-05-21 PROCEDURE — 99214 OFFICE O/P EST MOD 30 MIN: CPT | Performed by: PAIN MEDICINE

## 2025-05-21 PROCEDURE — 80305 DRUG TEST PRSMV DIR OPT OBS: CPT | Performed by: PAIN MEDICINE

## 2025-05-21 RX ORDER — BUPRENORPHINE AND NALOXONE 2; .5 MG/1; MG/1
0.5 FILM, SOLUBLE BUCCAL; SUBLINGUAL DAILY
Qty: 6 FILM | Refills: 0 | Status: SHIPPED | OUTPATIENT
Start: 2025-05-21 | End: 2025-06-18

## 2025-06-18 ENCOUNTER — OFFICE VISIT (OUTPATIENT)
Age: 35
End: 2025-06-18
Payer: COMMERCIAL

## 2025-06-18 VITALS
HEIGHT: 67 IN | WEIGHT: 160 LBS | HEART RATE: 86 BPM | OXYGEN SATURATION: 100 % | DIASTOLIC BLOOD PRESSURE: 90 MMHG | BODY MASS INDEX: 25.11 KG/M2 | TEMPERATURE: 96.9 F | SYSTOLIC BLOOD PRESSURE: 140 MMHG

## 2025-06-18 DIAGNOSIS — F11.20 UNCOMPLICATED OPIOID DEPENDENCE (HCC): ICD-10-CM

## 2025-06-18 PROCEDURE — 99214 OFFICE O/P EST MOD 30 MIN: CPT | Performed by: PAIN MEDICINE

## 2025-06-18 RX ORDER — BUPRENORPHINE AND NALOXONE 2; .5 MG/1; MG/1
0.5 FILM, SOLUBLE BUCCAL; SUBLINGUAL DAILY
Qty: 6 FILM | Refills: 0 | Status: SHIPPED | OUTPATIENT
Start: 2025-06-18 | End: 2025-07-16

## 2025-06-18 NOTE — PROGRESS NOTES
Suzanna Maria (:  1990) is a 32 y.o. female,Established patient, here for evaluation of the following chief complaint(s):  Opioid dependence         ASSESSMENT/PLAN:  1. Uncomplicated opioid dependence (HCC)  On SUBOXONE 2-0.5 MG FILM SL film; Taking 1/3 Film under the tongue in the morning    Pt is here today for follow up in our addiction clinic.  No cravings and no withdrawals.   No allergic reactions, no reactions and able to ADLs. OARRs is reviewed and consistent. UA is consistent. Will continue current meds.Weaned off off to less than 1 mg a day. Discussed Vivitrol.           Subjective   SUBJECTIVE/OBJECTIVE:  History of Present Illness    Patient Identification  Suzanna Maria is a 32 y.o. female.    Patient information was obtained from patient.  History/Exam limitations: none.  Patient presented to the Emergency Department ambulatory.    Chief Complaint   No chief complaint on file.    Pt is here today for follow up in our addiction clinic. She is due refill of Suboxone 2 mg-0.5 mg sublingual film place 1/2  films every day by sublingual route as needed for 28 days. No cravings and no withdrawals. She brought in meeting sheet with 11 signatures on it. No allergic reactions, no reactions and able to ADLs.  OARRs is consistent  today. UA in house  is consistent.    Past Medical History:  No date: Hepatitis C  History reviewed.  No pertinent family history.    Current Outpatient Medications:  SUBOXONE 2-0.5 MG FILM SL film, Place 1 Film under the tongue in the morning for 28 days., Disp: 28 Film, Rfl: 0  gabapentin (NEURONTIN) 300 MG capsule, Take 300 mg by mouth 3 times daily (Patient not taking: Reported on 7/15/2022), Disp: , Rfl:     No current facility-administered medications for this visit.    No Known Allergies  Social History    Socioeconomic History      Marital status: Single      Spouse name: Not on file      Number of children: Not on file      Years of education: Not on file

## 2025-06-19 ENCOUNTER — TELEPHONE (OUTPATIENT)
Age: 35
End: 2025-06-19

## 2025-06-19 NOTE — TELEPHONE ENCOUNTER
Suzanna from [facility] called stating pt is trying to  the patches 3 days early as she did not  last prescription until 5/23. Pharmacy would like to know if it is okay to fill early and see if we can explain better how the patient is using this medication. Please Advise.

## 2025-07-16 ENCOUNTER — OFFICE VISIT (OUTPATIENT)
Age: 35
End: 2025-07-16
Payer: COMMERCIAL

## 2025-07-16 VITALS
HEART RATE: 88 BPM | TEMPERATURE: 97.8 F | BODY MASS INDEX: 25.11 KG/M2 | DIASTOLIC BLOOD PRESSURE: 88 MMHG | WEIGHT: 160 LBS | HEIGHT: 67 IN | SYSTOLIC BLOOD PRESSURE: 136 MMHG | OXYGEN SATURATION: 97 %

## 2025-07-16 DIAGNOSIS — F11.20 UNCOMPLICATED OPIOID DEPENDENCE (HCC): ICD-10-CM

## 2025-07-16 PROCEDURE — 99214 OFFICE O/P EST MOD 30 MIN: CPT | Performed by: PAIN MEDICINE

## 2025-07-16 PROCEDURE — 80305 DRUG TEST PRSMV DIR OPT OBS: CPT | Performed by: PAIN MEDICINE

## 2025-07-16 RX ORDER — BUPRENORPHINE AND NALOXONE 2; .5 MG/1; MG/1
0.5 FILM, SOLUBLE BUCCAL; SUBLINGUAL DAILY
Qty: 6 FILM | Refills: 0 | Status: SHIPPED | OUTPATIENT
Start: 2025-07-16 | End: 2025-08-13

## 2025-08-15 ENCOUNTER — OFFICE VISIT (OUTPATIENT)
Age: 35
End: 2025-08-15
Payer: COMMERCIAL

## 2025-08-15 VITALS
TEMPERATURE: 97.8 F | WEIGHT: 160 LBS | OXYGEN SATURATION: 98 % | HEART RATE: 86 BPM | HEIGHT: 67 IN | BODY MASS INDEX: 25.11 KG/M2

## 2025-08-15 DIAGNOSIS — F11.20 UNCOMPLICATED OPIOID DEPENDENCE (HCC): ICD-10-CM

## 2025-08-15 PROCEDURE — 80305 DRUG TEST PRSMV DIR OPT OBS: CPT | Performed by: PAIN MEDICINE

## 2025-08-15 PROCEDURE — 99214 OFFICE O/P EST MOD 30 MIN: CPT | Performed by: PAIN MEDICINE

## 2025-08-15 RX ORDER — BUPRENORPHINE AND NALOXONE 2; .5 MG/1; MG/1
0.5 FILM, SOLUBLE BUCCAL; SUBLINGUAL DAILY
Qty: 6 FILM | Refills: 0 | Status: SHIPPED | OUTPATIENT
Start: 2025-08-15 | End: 2025-09-12